# Patient Record
Sex: MALE | Race: WHITE | Employment: OTHER | URBAN - METROPOLITAN AREA
[De-identification: names, ages, dates, MRNs, and addresses within clinical notes are randomized per-mention and may not be internally consistent; named-entity substitution may affect disease eponyms.]

---

## 2017-03-01 DIAGNOSIS — I25.10 CORONARY ARTERY DISEASE INVOLVING NATIVE CORONARY ARTERY OF NATIVE HEART WITHOUT ANGINA PECTORIS: ICD-10-CM

## 2017-03-01 DIAGNOSIS — I10 ESSENTIAL HYPERTENSION: ICD-10-CM

## 2017-03-02 RX ORDER — PRASUGREL HCL 10 MG
TABLET ORAL
Qty: 30 TABLET | Refills: 0 | Status: SHIPPED | OUTPATIENT
Start: 2017-03-02 | End: 2017-03-08 | Stop reason: SDUPTHER

## 2017-03-02 RX ORDER — ASPIRIN 81 MG/1
TABLET ORAL
Qty: 30 TABLET | Refills: 0 | Status: SHIPPED | OUTPATIENT
Start: 2017-03-02 | End: 2017-03-08 | Stop reason: SDUPTHER

## 2017-03-02 RX ORDER — METOPROLOL SUCCINATE 25 MG/1
TABLET, EXTENDED RELEASE ORAL
Qty: 30 TABLET | Refills: 0 | Status: SHIPPED | OUTPATIENT
Start: 2017-03-02 | End: 2017-03-08 | Stop reason: SDUPTHER

## 2017-03-07 DIAGNOSIS — I10 ESSENTIAL HYPERTENSION: ICD-10-CM

## 2017-03-07 DIAGNOSIS — I25.10 CORONARY ARTERY DISEASE INVOLVING NATIVE CORONARY ARTERY OF NATIVE HEART WITHOUT ANGINA PECTORIS: ICD-10-CM

## 2017-03-07 RX ORDER — LISINOPRIL 2.5 MG/1
TABLET ORAL
Qty: 30 TABLET | Refills: 11 | OUTPATIENT
Start: 2017-03-07

## 2017-03-07 RX ORDER — PRASUGREL 10 MG/1
TABLET, FILM COATED ORAL
Qty: 30 TABLET | Refills: 0 | OUTPATIENT
Start: 2017-03-07

## 2017-03-07 RX ORDER — METOPROLOL SUCCINATE 25 MG/1
TABLET, EXTENDED RELEASE ORAL
Qty: 30 TABLET | Refills: 0 | OUTPATIENT
Start: 2017-03-07

## 2017-03-07 RX ORDER — ASPIRIN 81 MG/1
TABLET ORAL
Qty: 30 TABLET | Refills: 0 | OUTPATIENT
Start: 2017-03-07

## 2017-03-07 RX ORDER — ATORVASTATIN CALCIUM 80 MG/1
TABLET, FILM COATED ORAL
Qty: 30 TABLET | Refills: 11 | OUTPATIENT
Start: 2017-03-07

## 2017-03-08 ENCOUNTER — OFFICE VISIT (OUTPATIENT)
Dept: PRIMARY CARE CLINIC | Age: 67
End: 2017-03-08

## 2017-03-08 VITALS
RESPIRATION RATE: 16 BRPM | BODY MASS INDEX: 27.74 KG/M2 | WEIGHT: 183 LBS | OXYGEN SATURATION: 98 % | HEART RATE: 93 BPM | HEIGHT: 68 IN | SYSTOLIC BLOOD PRESSURE: 100 MMHG | DIASTOLIC BLOOD PRESSURE: 72 MMHG

## 2017-03-08 DIAGNOSIS — I10 ESSENTIAL HYPERTENSION: ICD-10-CM

## 2017-03-08 DIAGNOSIS — I25.10 CORONARY ARTERY DISEASE INVOLVING NATIVE CORONARY ARTERY OF NATIVE HEART WITHOUT ANGINA PECTORIS: ICD-10-CM

## 2017-03-08 DIAGNOSIS — Z23 NEED FOR INFLUENZA VACCINATION: Primary | ICD-10-CM

## 2017-03-08 PROCEDURE — 90658 IIV3 VACCINE SPLT 0.5 ML IM: CPT | Performed by: INTERNAL MEDICINE

## 2017-03-08 PROCEDURE — 99213 OFFICE O/P EST LOW 20 MIN: CPT | Performed by: INTERNAL MEDICINE

## 2017-03-08 PROCEDURE — 90471 IMMUNIZATION ADMIN: CPT | Performed by: INTERNAL MEDICINE

## 2017-03-08 RX ORDER — PRASUGREL 10 MG/1
10 TABLET, FILM COATED ORAL DAILY
Qty: 30 TABLET | Refills: 11 | Status: SHIPPED | OUTPATIENT
Start: 2017-03-08 | End: 2018-03-14 | Stop reason: SDUPTHER

## 2017-03-08 RX ORDER — ASPIRIN 81 MG/1
81 TABLET ORAL DAILY
Qty: 30 TABLET | Refills: 11 | Status: SHIPPED | OUTPATIENT
Start: 2017-03-08

## 2017-03-08 RX ORDER — ATORVASTATIN CALCIUM 80 MG/1
80 TABLET, FILM COATED ORAL NIGHTLY
Qty: 30 TABLET | Refills: 11 | Status: SHIPPED | OUTPATIENT
Start: 2017-03-08 | End: 2018-03-14 | Stop reason: SDUPTHER

## 2017-03-08 RX ORDER — METOPROLOL SUCCINATE 25 MG/1
25 TABLET, EXTENDED RELEASE ORAL DAILY
Qty: 30 TABLET | Refills: 11 | Status: SHIPPED | OUTPATIENT
Start: 2017-03-08 | End: 2018-03-13 | Stop reason: SDUPTHER

## 2017-03-08 RX ORDER — LISINOPRIL 2.5 MG/1
2.5 TABLET ORAL DAILY
Qty: 30 TABLET | Refills: 11 | Status: SHIPPED | OUTPATIENT
Start: 2017-03-08 | End: 2018-03-14 | Stop reason: SDUPTHER

## 2017-03-19 ASSESSMENT — ENCOUNTER SYMPTOMS
BLURRED VISION: 0
PHOTOPHOBIA: 0
SHORTNESS OF BREATH: 0
CHEST TIGHTNESS: 0
VOMITING: 0
TROUBLE SWALLOWING: 0
CHOKING: 0
NAUSEA: 0
VOICE CHANGE: 0

## 2017-07-31 RX ORDER — NITROGLYCERIN 0.4 MG/1
TABLET SUBLINGUAL
Qty: 25 TABLET | Refills: 2 | Status: SHIPPED | OUTPATIENT
Start: 2017-07-31 | End: 2018-03-14 | Stop reason: SDUPTHER

## 2018-03-02 DIAGNOSIS — I10 ESSENTIAL HYPERTENSION: ICD-10-CM

## 2018-03-02 RX ORDER — METOPROLOL SUCCINATE 25 MG/1
25 TABLET, EXTENDED RELEASE ORAL DAILY
Qty: 30 TABLET | Refills: 11 | OUTPATIENT
Start: 2018-03-02

## 2018-03-13 ENCOUNTER — OFFICE VISIT (OUTPATIENT)
Dept: PRIMARY CARE CLINIC | Age: 68
End: 2018-03-13
Payer: MEDICARE

## 2018-03-13 VITALS
HEIGHT: 68 IN | SYSTOLIC BLOOD PRESSURE: 118 MMHG | RESPIRATION RATE: 16 BRPM | BODY MASS INDEX: 28.05 KG/M2 | TEMPERATURE: 96.1 F | DIASTOLIC BLOOD PRESSURE: 80 MMHG | HEART RATE: 55 BPM | WEIGHT: 185.1 LBS | OXYGEN SATURATION: 98 %

## 2018-03-13 DIAGNOSIS — E78.00 PURE HYPERCHOLESTEROLEMIA: ICD-10-CM

## 2018-03-13 DIAGNOSIS — I25.10 CORONARY ARTERY DISEASE INVOLVING NATIVE CORONARY ARTERY OF NATIVE HEART WITHOUT ANGINA PECTORIS: ICD-10-CM

## 2018-03-13 DIAGNOSIS — E79.0 HYPERURICEMIA: ICD-10-CM

## 2018-03-13 DIAGNOSIS — Z00.00 PREVENTATIVE HEALTH CARE: ICD-10-CM

## 2018-03-13 DIAGNOSIS — Z11.59 ENCOUNTER FOR HEPATITIS C SCREENING TEST FOR LOW RISK PATIENT: ICD-10-CM

## 2018-03-13 DIAGNOSIS — Z12.5 PROSTATE CANCER SCREENING: ICD-10-CM

## 2018-03-13 DIAGNOSIS — Z23 NEED FOR VACCINATION WITH 13-POLYVALENT PNEUMOCOCCAL CONJUGATE VACCINE: ICD-10-CM

## 2018-03-13 DIAGNOSIS — I10 ESSENTIAL HYPERTENSION: Primary | ICD-10-CM

## 2018-03-13 PROCEDURE — 3017F COLORECTAL CA SCREEN DOC REV: CPT | Performed by: INTERNAL MEDICINE

## 2018-03-13 PROCEDURE — 1036F TOBACCO NON-USER: CPT | Performed by: INTERNAL MEDICINE

## 2018-03-13 PROCEDURE — 99214 OFFICE O/P EST MOD 30 MIN: CPT | Performed by: INTERNAL MEDICINE

## 2018-03-13 PROCEDURE — G8482 FLU IMMUNIZE ORDER/ADMIN: HCPCS | Performed by: INTERNAL MEDICINE

## 2018-03-13 PROCEDURE — 1123F ACP DISCUSS/DSCN MKR DOCD: CPT | Performed by: INTERNAL MEDICINE

## 2018-03-13 PROCEDURE — G8598 ASA/ANTIPLAT THER USED: HCPCS | Performed by: INTERNAL MEDICINE

## 2018-03-13 PROCEDURE — G8419 CALC BMI OUT NRM PARAM NOF/U: HCPCS | Performed by: INTERNAL MEDICINE

## 2018-03-13 PROCEDURE — 90670 PCV13 VACCINE IM: CPT | Performed by: INTERNAL MEDICINE

## 2018-03-13 PROCEDURE — 4040F PNEUMOC VAC/ADMIN/RCVD: CPT | Performed by: INTERNAL MEDICINE

## 2018-03-13 PROCEDURE — G0009 ADMIN PNEUMOCOCCAL VACCINE: HCPCS | Performed by: INTERNAL MEDICINE

## 2018-03-13 PROCEDURE — G8427 DOCREV CUR MEDS BY ELIG CLIN: HCPCS | Performed by: INTERNAL MEDICINE

## 2018-03-13 RX ORDER — METOPROLOL SUCCINATE 25 MG/1
25 TABLET, EXTENDED RELEASE ORAL DAILY
Qty: 90 TABLET | Refills: 3 | Status: SHIPPED | OUTPATIENT
Start: 2018-03-13 | End: 2018-03-13 | Stop reason: SDUPTHER

## 2018-03-13 RX ORDER — METOPROLOL SUCCINATE 25 MG/1
25 TABLET, EXTENDED RELEASE ORAL DAILY
Qty: 90 TABLET | Refills: 3 | Status: SHIPPED | OUTPATIENT
Start: 2018-03-13 | End: 2019-03-12 | Stop reason: SDUPTHER

## 2018-03-13 RX ORDER — INFLUENZA VACCINE, ADJUVANTED 15; 15; 15 UG/.5ML; UG/.5ML; UG/.5ML
INJECTION, SUSPENSION INTRAMUSCULAR
Refills: 0 | COMMUNITY
Start: 2018-01-29 | End: 2018-05-07

## 2018-03-13 RX ORDER — METOPROLOL SUCCINATE 25 MG/1
25 TABLET, EXTENDED RELEASE ORAL DAILY
Qty: 14 TABLET | Refills: 0 | Status: SHIPPED | OUTPATIENT
Start: 2018-03-13 | End: 2018-05-07

## 2018-03-13 RX ORDER — PRASUGREL 10 MG/1
10 TABLET, FILM COATED ORAL
COMMUNITY
End: 2019-03-12 | Stop reason: SDUPTHER

## 2018-03-13 RX ORDER — METOPROLOL SUCCINATE 25 MG/1
25 TABLET, EXTENDED RELEASE ORAL
COMMUNITY
Start: 2015-01-13 | End: 2018-05-07

## 2018-03-13 RX ORDER — ALBUTEROL SULFATE 90 UG/1
2 AEROSOL, METERED RESPIRATORY (INHALATION)
COMMUNITY
Start: 2015-01-13 | End: 2018-05-07

## 2018-03-13 ASSESSMENT — PATIENT HEALTH QUESTIONNAIRE - PHQ9
SUM OF ALL RESPONSES TO PHQ QUESTIONS 1-9: 0
1. LITTLE INTEREST OR PLEASURE IN DOING THINGS: 0
SUM OF ALL RESPONSES TO PHQ9 QUESTIONS 1 & 2: 0
2. FEELING DOWN, DEPRESSED OR HOPELESS: 0

## 2018-03-14 DIAGNOSIS — I10 ESSENTIAL HYPERTENSION: ICD-10-CM

## 2018-03-14 DIAGNOSIS — I25.10 CORONARY ARTERY DISEASE INVOLVING NATIVE CORONARY ARTERY OF NATIVE HEART WITHOUT ANGINA PECTORIS: ICD-10-CM

## 2018-03-14 RX ORDER — LISINOPRIL 2.5 MG/1
2.5 TABLET ORAL DAILY
Qty: 30 TABLET | Refills: 11 | Status: SHIPPED | OUTPATIENT
Start: 2018-03-14 | End: 2018-05-18 | Stop reason: SDUPTHER

## 2018-03-14 RX ORDER — ATORVASTATIN CALCIUM 80 MG/1
40 TABLET, FILM COATED ORAL NIGHTLY
Qty: 30 TABLET | Refills: 5 | Status: SHIPPED | OUTPATIENT
Start: 2018-03-14 | End: 2018-05-18 | Stop reason: SDUPTHER

## 2018-03-14 RX ORDER — NITROGLYCERIN 0.4 MG/1
TABLET SUBLINGUAL
Qty: 25 TABLET | Refills: 2 | Status: SHIPPED | OUTPATIENT
Start: 2018-03-14 | End: 2019-03-12 | Stop reason: SDUPTHER

## 2018-03-14 RX ORDER — PRASUGREL 10 MG/1
10 TABLET, FILM COATED ORAL DAILY
Qty: 30 TABLET | Refills: 11 | Status: SHIPPED | OUTPATIENT
Start: 2018-03-14 | End: 2018-05-07

## 2018-03-15 DIAGNOSIS — E78.00 PURE HYPERCHOLESTEROLEMIA: ICD-10-CM

## 2018-03-15 DIAGNOSIS — Z00.00 PREVENTATIVE HEALTH CARE: ICD-10-CM

## 2018-03-15 DIAGNOSIS — Z11.59 ENCOUNTER FOR HEPATITIS C SCREENING TEST FOR LOW RISK PATIENT: ICD-10-CM

## 2018-03-15 DIAGNOSIS — Z12.5 PROSTATE CANCER SCREENING: ICD-10-CM

## 2018-03-15 DIAGNOSIS — E79.0 HYPERURICEMIA: ICD-10-CM

## 2018-03-15 DIAGNOSIS — I25.10 CORONARY ARTERY DISEASE INVOLVING NATIVE CORONARY ARTERY OF NATIVE HEART WITHOUT ANGINA PECTORIS: ICD-10-CM

## 2018-03-15 DIAGNOSIS — I10 ESSENTIAL HYPERTENSION: ICD-10-CM

## 2018-03-15 LAB
ALBUMIN SERPL-MCNC: 4.4 G/DL (ref 3.9–4.9)
ALP BLD-CCNC: 96 U/L (ref 35–104)
ALT SERPL-CCNC: 20 U/L (ref 0–41)
ANION GAP SERPL CALCULATED.3IONS-SCNC: 14 MEQ/L (ref 7–13)
AST SERPL-CCNC: 20 U/L (ref 0–40)
BASOPHILS ABSOLUTE: 0 K/UL (ref 0–0.2)
BASOPHILS RELATIVE PERCENT: 0.6 %
BILIRUB SERPL-MCNC: 0.6 MG/DL (ref 0–1.2)
BUN BLDV-MCNC: 21 MG/DL (ref 8–23)
CALCIUM SERPL-MCNC: 9.2 MG/DL (ref 8.6–10.2)
CHLORIDE BLD-SCNC: 102 MEQ/L (ref 98–107)
CHOLESTEROL, TOTAL: 130 MG/DL (ref 0–199)
CO2: 26 MEQ/L (ref 22–29)
CREAT SERPL-MCNC: 0.72 MG/DL (ref 0.7–1.2)
EOSINOPHILS ABSOLUTE: 0.2 K/UL (ref 0–0.7)
EOSINOPHILS RELATIVE PERCENT: 4 %
GFR AFRICAN AMERICAN: >60
GFR NON-AFRICAN AMERICAN: >60
GLOBULIN: 2.2 G/DL (ref 2.3–3.5)
GLUCOSE BLD-MCNC: 107 MG/DL (ref 74–109)
HCT VFR BLD CALC: 44.7 % (ref 42–52)
HDLC SERPL-MCNC: 49 MG/DL (ref 40–59)
HEMOGLOBIN: 15.4 G/DL (ref 14–18)
HEPATITIS C ANTIBODY INTERPRETATION: NORMAL
LDL CHOLESTEROL CALCULATED: 66 MG/DL (ref 0–129)
LYMPHOCYTES ABSOLUTE: 1 K/UL (ref 1–4.8)
LYMPHOCYTES RELATIVE PERCENT: 17.5 %
MCH RBC QN AUTO: 33.2 PG (ref 27–31.3)
MCHC RBC AUTO-ENTMCNC: 34.4 % (ref 33–37)
MCV RBC AUTO: 96.3 FL (ref 80–100)
MONOCYTES ABSOLUTE: 0.4 K/UL (ref 0.2–0.8)
MONOCYTES RELATIVE PERCENT: 7.7 %
NEUTROPHILS ABSOLUTE: 4.1 K/UL (ref 1.4–6.5)
NEUTROPHILS RELATIVE PERCENT: 70.2 %
PDW BLD-RTO: 13.1 % (ref 11.5–14.5)
PLATELET # BLD: 180 K/UL (ref 130–400)
POTASSIUM SERPL-SCNC: 4.3 MEQ/L (ref 3.5–5.1)
PROSTATE SPECIFIC ANTIGEN: 5.41 NG/ML (ref 0–5.4)
RBC # BLD: 4.64 M/UL (ref 4.7–6.1)
SODIUM BLD-SCNC: 142 MEQ/L (ref 132–144)
TOTAL PROTEIN: 6.6 G/DL (ref 6.4–8.1)
TRIGL SERPL-MCNC: 73 MG/DL (ref 0–200)
URIC ACID, SERUM: 4.9 MG/DL (ref 3.4–7)
WBC # BLD: 5.9 K/UL (ref 4.8–10.8)

## 2018-03-18 ASSESSMENT — ENCOUNTER SYMPTOMS
VOMITING: 0
TROUBLE SWALLOWING: 0
CHOKING: 0
NAUSEA: 0
VOICE CHANGE: 0
SHORTNESS OF BREATH: 0
CHEST TIGHTNESS: 0
BLURRED VISION: 0
PHOTOPHOBIA: 0

## 2018-03-22 DIAGNOSIS — R97.20 ELEVATED PSA: Primary | ICD-10-CM

## 2018-03-22 RX ORDER — CIPROFLOXACIN 500 MG/1
500 TABLET, FILM COATED ORAL 2 TIMES DAILY
Qty: 42 TABLET | Refills: 0 | Status: SHIPPED | OUTPATIENT
Start: 2018-03-22 | End: 2018-05-07

## 2018-03-27 DIAGNOSIS — R97.20 ELEVATED PSA: Primary | ICD-10-CM

## 2018-03-28 ENCOUNTER — TELEPHONE (OUTPATIENT)
Dept: PRIMARY CARE CLINIC | Age: 68
End: 2018-03-28

## 2018-03-28 RX ORDER — SULFAMETHOXAZOLE AND TRIMETHOPRIM 800; 160 MG/1; MG/1
1 TABLET ORAL 2 TIMES DAILY
Qty: 28 TABLET | Refills: 0 | Status: SHIPPED | OUTPATIENT
Start: 2018-03-28 | End: 2018-04-11

## 2018-04-09 ENCOUNTER — TELEPHONE (OUTPATIENT)
Dept: PRIMARY CARE CLINIC | Age: 68
End: 2018-04-09

## 2018-04-12 ENCOUNTER — OFFICE VISIT (OUTPATIENT)
Dept: PRIMARY CARE CLINIC | Age: 68
End: 2018-04-12
Payer: MEDICARE

## 2018-04-12 VITALS
DIASTOLIC BLOOD PRESSURE: 70 MMHG | HEIGHT: 68 IN | BODY MASS INDEX: 27.16 KG/M2 | SYSTOLIC BLOOD PRESSURE: 110 MMHG | HEART RATE: 73 BPM | TEMPERATURE: 97.9 F | RESPIRATION RATE: 16 BRPM | OXYGEN SATURATION: 96 % | WEIGHT: 179.2 LBS

## 2018-04-12 DIAGNOSIS — R97.20 ELEVATED PSA: Primary | ICD-10-CM

## 2018-04-12 DIAGNOSIS — R97.20 ELEVATED PSA: ICD-10-CM

## 2018-04-12 PROCEDURE — 3017F COLORECTAL CA SCREEN DOC REV: CPT | Performed by: INTERNAL MEDICINE

## 2018-04-12 PROCEDURE — 99212 OFFICE O/P EST SF 10 MIN: CPT | Performed by: INTERNAL MEDICINE

## 2018-04-12 PROCEDURE — 1123F ACP DISCUSS/DSCN MKR DOCD: CPT | Performed by: INTERNAL MEDICINE

## 2018-04-12 PROCEDURE — G8419 CALC BMI OUT NRM PARAM NOF/U: HCPCS | Performed by: INTERNAL MEDICINE

## 2018-04-12 PROCEDURE — G8427 DOCREV CUR MEDS BY ELIG CLIN: HCPCS | Performed by: INTERNAL MEDICINE

## 2018-04-12 PROCEDURE — 1036F TOBACCO NON-USER: CPT | Performed by: INTERNAL MEDICINE

## 2018-04-12 PROCEDURE — 4040F PNEUMOC VAC/ADMIN/RCVD: CPT | Performed by: INTERNAL MEDICINE

## 2018-04-12 PROCEDURE — G8598 ASA/ANTIPLAT THER USED: HCPCS | Performed by: INTERNAL MEDICINE

## 2018-04-16 LAB
PROSTATE SPECIFIC ANTIGEN FREE: 0.6 UG/L
PROSTATE SPECIFIC ANTIGEN PERCENT FREE: 12.2 %
PROSTATE SPECIFIC ANTIGEN: 4.9 UG/L (ref 0–4)

## 2018-04-16 ASSESSMENT — ENCOUNTER SYMPTOMS
VOMITING: 0
PHOTOPHOBIA: 0
NAUSEA: 0
SHORTNESS OF BREATH: 0
TROUBLE SWALLOWING: 0
VOICE CHANGE: 0
CHOKING: 0

## 2018-05-07 ENCOUNTER — OFFICE VISIT (OUTPATIENT)
Dept: UROLOGY | Age: 68
End: 2018-05-07
Payer: MEDICARE

## 2018-05-07 VITALS
SYSTOLIC BLOOD PRESSURE: 102 MMHG | BODY MASS INDEX: 26.52 KG/M2 | HEART RATE: 65 BPM | WEIGHT: 175 LBS | HEIGHT: 68 IN | DIASTOLIC BLOOD PRESSURE: 64 MMHG

## 2018-05-07 DIAGNOSIS — R33.9 URINARY RETENTION: ICD-10-CM

## 2018-05-07 DIAGNOSIS — R97.20 ELEVATED PSA: Primary | ICD-10-CM

## 2018-05-07 LAB
BILIRUBIN, POC: NORMAL
BLOOD URINE, POC: NORMAL
CLARITY, POC: CLEAR
COLOR, POC: YELLOW
GLUCOSE URINE, POC: NORMAL
KETONES, POC: NORMAL
LEUKOCYTE EST, POC: NORMAL
NITRITE, POC: NORMAL
PH, POC: 5
POST VOID RESIDUAL (PVR): 82 ML
PROTEIN, POC: NORMAL
SPECIFIC GRAVITY, POC: 1.02
UROBILINOGEN, POC: 0.2

## 2018-05-07 PROCEDURE — 81003 URINALYSIS AUTO W/O SCOPE: CPT | Performed by: UROLOGY

## 2018-05-07 PROCEDURE — G8598 ASA/ANTIPLAT THER USED: HCPCS | Performed by: UROLOGY

## 2018-05-07 PROCEDURE — 1123F ACP DISCUSS/DSCN MKR DOCD: CPT | Performed by: UROLOGY

## 2018-05-07 PROCEDURE — 3017F COLORECTAL CA SCREEN DOC REV: CPT | Performed by: UROLOGY

## 2018-05-07 PROCEDURE — 51798 US URINE CAPACITY MEASURE: CPT | Performed by: UROLOGY

## 2018-05-07 PROCEDURE — G8419 CALC BMI OUT NRM PARAM NOF/U: HCPCS | Performed by: UROLOGY

## 2018-05-07 PROCEDURE — 1036F TOBACCO NON-USER: CPT | Performed by: UROLOGY

## 2018-05-07 PROCEDURE — 4040F PNEUMOC VAC/ADMIN/RCVD: CPT | Performed by: UROLOGY

## 2018-05-07 PROCEDURE — G8427 DOCREV CUR MEDS BY ELIG CLIN: HCPCS | Performed by: UROLOGY

## 2018-05-07 PROCEDURE — 99203 OFFICE O/P NEW LOW 30 MIN: CPT | Performed by: UROLOGY

## 2018-05-18 DIAGNOSIS — I10 ESSENTIAL HYPERTENSION: ICD-10-CM

## 2018-05-18 DIAGNOSIS — I25.10 CORONARY ARTERY DISEASE INVOLVING NATIVE CORONARY ARTERY OF NATIVE HEART WITHOUT ANGINA PECTORIS: ICD-10-CM

## 2018-05-19 RX ORDER — LISINOPRIL 2.5 MG/1
2.5 TABLET ORAL DAILY
Qty: 90 TABLET | Refills: 1 | Status: SHIPPED | OUTPATIENT
Start: 2018-05-19 | End: 2018-10-04 | Stop reason: SDUPTHER

## 2018-05-19 RX ORDER — ATORVASTATIN CALCIUM 40 MG/1
40 TABLET, FILM COATED ORAL NIGHTLY
Qty: 90 TABLET | Refills: 1 | Status: SHIPPED | OUTPATIENT
Start: 2018-05-19 | End: 2018-10-04 | Stop reason: SDUPTHER

## 2018-05-21 ENCOUNTER — HOSPITAL ENCOUNTER (OUTPATIENT)
Dept: PREADMISSION TESTING | Age: 68
Discharge: HOME | End: 2018-05-25
Payer: MEDICARE

## 2018-05-21 VITALS
SYSTOLIC BLOOD PRESSURE: 111 MMHG | HEART RATE: 63 BPM | BODY MASS INDEX: 27.49 KG/M2 | OXYGEN SATURATION: 97 % | HEIGHT: 68 IN | RESPIRATION RATE: 16 BRPM | TEMPERATURE: 97.6 F | WEIGHT: 181.4 LBS | DIASTOLIC BLOOD PRESSURE: 69 MMHG

## 2018-05-21 DIAGNOSIS — R97.20 ELEVATED PSA: ICD-10-CM

## 2018-05-21 LAB
ANION GAP SERPL CALCULATED.3IONS-SCNC: 13 MEQ/L (ref 7–13)
BUN BLDV-MCNC: 15 MG/DL (ref 8–23)
CALCIUM SERPL-MCNC: 9.4 MG/DL (ref 8.6–10.2)
CHLORIDE BLD-SCNC: 105 MEQ/L (ref 98–107)
CO2: 24 MEQ/L (ref 22–29)
CREAT SERPL-MCNC: 0.81 MG/DL (ref 0.7–1.2)
EKG ATRIAL RATE: 58 BPM
EKG P AXIS: 53 DEGREES
EKG P-R INTERVAL: 194 MS
EKG Q-T INTERVAL: 402 MS
EKG QRS DURATION: 80 MS
EKG QTC CALCULATION (BAZETT): 394 MS
EKG R AXIS: -19 DEGREES
EKG T AXIS: 49 DEGREES
EKG VENTRICULAR RATE: 58 BPM
GFR AFRICAN AMERICAN: >60
GFR NON-AFRICAN AMERICAN: >60
GLUCOSE BLD-MCNC: 96 MG/DL (ref 74–109)
HCT VFR BLD CALC: 44.9 % (ref 42–52)
HEMOGLOBIN: 15.5 G/DL (ref 14–18)
MCH RBC QN AUTO: 32.7 PG (ref 27–31.3)
MCHC RBC AUTO-ENTMCNC: 34.6 % (ref 33–37)
MCV RBC AUTO: 94.5 FL (ref 80–100)
PDW BLD-RTO: 13 % (ref 11.5–14.5)
PLATELET # BLD: 182 K/UL (ref 130–400)
POTASSIUM SERPL-SCNC: 4.6 MEQ/L (ref 3.5–5.1)
RBC # BLD: 4.75 M/UL (ref 4.7–6.1)
SODIUM BLD-SCNC: 142 MEQ/L (ref 132–144)
WBC # BLD: 6.1 K/UL (ref 4.8–10.8)

## 2018-05-21 PROCEDURE — 93005 ELECTROCARDIOGRAM TRACING: CPT

## 2018-05-21 PROCEDURE — 85027 COMPLETE CBC AUTOMATED: CPT

## 2018-05-21 PROCEDURE — 80048 BASIC METABOLIC PNL TOTAL CA: CPT

## 2018-05-21 RX ORDER — SODIUM PHOSPHATE,MONO-DIBASIC 19G-7G/118
ENEMA (ML) RECTAL DAILY
COMMUNITY

## 2018-05-21 RX ORDER — LIDOCAINE HYDROCHLORIDE 10 MG/ML
1 INJECTION, SOLUTION EPIDURAL; INFILTRATION; INTRACAUDAL; PERINEURAL
Status: CANCELLED | OUTPATIENT
Start: 2018-05-21 | End: 2018-05-21

## 2018-05-21 RX ORDER — SODIUM CHLORIDE 0.9 % (FLUSH) 0.9 %
10 SYRINGE (ML) INJECTION EVERY 12 HOURS SCHEDULED
Status: CANCELLED | OUTPATIENT
Start: 2018-05-21

## 2018-05-21 RX ORDER — SODIUM CHLORIDE, SODIUM LACTATE, POTASSIUM CHLORIDE, CALCIUM CHLORIDE 600; 310; 30; 20 MG/100ML; MG/100ML; MG/100ML; MG/100ML
INJECTION, SOLUTION INTRAVENOUS CONTINUOUS
Status: CANCELLED | OUTPATIENT
Start: 2018-05-21

## 2018-05-21 RX ORDER — SODIUM CHLORIDE 0.9 % (FLUSH) 0.9 %
10 SYRINGE (ML) INJECTION PRN
Status: CANCELLED | OUTPATIENT
Start: 2018-05-21

## 2018-05-22 ENCOUNTER — ANESTHESIA EVENT (OUTPATIENT)
Dept: OPERATING ROOM | Age: 68
End: 2018-05-22
Payer: MEDICARE

## 2018-05-22 PROCEDURE — 93010 ELECTROCARDIOGRAM REPORT: CPT | Performed by: INTERNAL MEDICINE

## 2018-05-23 ENCOUNTER — HOSPITAL ENCOUNTER (OUTPATIENT)
Dept: ULTRASOUND IMAGING | Age: 68
Discharge: HOME | End: 2018-05-25
Payer: MEDICARE

## 2018-05-23 ENCOUNTER — HOSPITAL ENCOUNTER (OUTPATIENT)
Age: 68
Setting detail: OUTPATIENT SURGERY
Discharge: HOME | End: 2018-05-23
Attending: UROLOGY | Admitting: UROLOGY
Payer: MEDICARE

## 2018-05-23 ENCOUNTER — ANESTHESIA (OUTPATIENT)
Dept: OPERATING ROOM | Age: 68
End: 2018-05-23
Payer: MEDICARE

## 2018-05-23 VITALS
HEART RATE: 66 BPM | BODY MASS INDEX: 27.43 KG/M2 | HEIGHT: 68 IN | RESPIRATION RATE: 16 BRPM | TEMPERATURE: 98.1 F | WEIGHT: 181 LBS | DIASTOLIC BLOOD PRESSURE: 83 MMHG | OXYGEN SATURATION: 100 % | SYSTOLIC BLOOD PRESSURE: 124 MMHG

## 2018-05-23 VITALS — SYSTOLIC BLOOD PRESSURE: 127 MMHG | OXYGEN SATURATION: 90 % | DIASTOLIC BLOOD PRESSURE: 79 MMHG

## 2018-05-23 PROCEDURE — 6360000002 HC RX W HCPCS: Performed by: NURSE PRACTITIONER

## 2018-05-23 PROCEDURE — 7100000011 HC PHASE II RECOVERY - ADDTL 15 MIN: Performed by: UROLOGY

## 2018-05-23 PROCEDURE — 2500000003 HC RX 250 WO HCPCS: Performed by: NURSE ANESTHETIST, CERTIFIED REGISTERED

## 2018-05-23 PROCEDURE — 3600000002 HC SURGERY LEVEL 2 BASE: Performed by: UROLOGY

## 2018-05-23 PROCEDURE — 76872 US TRANSRECTAL: CPT | Performed by: UROLOGY

## 2018-05-23 PROCEDURE — 55700 PR BIOPSY OF PROSTATE,NEEDLE/PUNCH: CPT | Performed by: UROLOGY

## 2018-05-23 PROCEDURE — 3600000012 HC SURGERY LEVEL 2 ADDTL 15MIN: Performed by: UROLOGY

## 2018-05-23 PROCEDURE — 3700000001 HC ADD 15 MINUTES (ANESTHESIA): Performed by: UROLOGY

## 2018-05-23 PROCEDURE — 2580000003 HC RX 258

## 2018-05-23 PROCEDURE — 7100000010 HC PHASE II RECOVERY - FIRST 15 MIN: Performed by: UROLOGY

## 2018-05-23 PROCEDURE — 7100000001 HC PACU RECOVERY - ADDTL 15 MIN: Performed by: UROLOGY

## 2018-05-23 PROCEDURE — 2580000003 HC RX 258: Performed by: NURSE PRACTITIONER

## 2018-05-23 PROCEDURE — 3700000000 HC ANESTHESIA ATTENDED CARE: Performed by: UROLOGY

## 2018-05-23 PROCEDURE — 99999 PR OFFICE/OUTPT VISIT,PROCEDURE ONLY: CPT | Performed by: UROLOGY

## 2018-05-23 PROCEDURE — 7100000000 HC PACU RECOVERY - FIRST 15 MIN: Performed by: UROLOGY

## 2018-05-23 PROCEDURE — 76942 ECHO GUIDE FOR BIOPSY: CPT

## 2018-05-23 PROCEDURE — 6360000002 HC RX W HCPCS: Performed by: NURSE ANESTHETIST, CERTIFIED REGISTERED

## 2018-05-23 RX ORDER — LIDOCAINE HYDROCHLORIDE 10 MG/ML
1 INJECTION, SOLUTION EPIDURAL; INFILTRATION; INTRACAUDAL; PERINEURAL
Status: DISCONTINUED | OUTPATIENT
Start: 2018-05-23 | End: 2018-05-23 | Stop reason: HOSPADM

## 2018-05-23 RX ORDER — SODIUM CHLORIDE 0.9 % (FLUSH) 0.9 %
10 SYRINGE (ML) INJECTION PRN
Status: DISCONTINUED | OUTPATIENT
Start: 2018-05-23 | End: 2018-05-23 | Stop reason: HOSPADM

## 2018-05-23 RX ORDER — ONDANSETRON 2 MG/ML
4 INJECTION INTRAMUSCULAR; INTRAVENOUS
Status: DISCONTINUED | OUTPATIENT
Start: 2018-05-23 | End: 2018-05-23 | Stop reason: HOSPADM

## 2018-05-23 RX ORDER — SODIUM CHLORIDE 0.9 % (FLUSH) 0.9 %
10 SYRINGE (ML) INJECTION EVERY 12 HOURS SCHEDULED
Status: DISCONTINUED | OUTPATIENT
Start: 2018-05-23 | End: 2018-05-23 | Stop reason: HOSPADM

## 2018-05-23 RX ORDER — LIDOCAINE HYDROCHLORIDE 20 MG/ML
INJECTION, SOLUTION INFILTRATION; PERINEURAL PRN
Status: DISCONTINUED | OUTPATIENT
Start: 2018-05-23 | End: 2018-05-23 | Stop reason: SDUPTHER

## 2018-05-23 RX ORDER — HYDROCODONE BITARTRATE AND ACETAMINOPHEN 5; 325 MG/1; MG/1
1 TABLET ORAL PRN
Status: DISCONTINUED | OUTPATIENT
Start: 2018-05-23 | End: 2018-05-23 | Stop reason: HOSPADM

## 2018-05-23 RX ORDER — METOCLOPRAMIDE HYDROCHLORIDE 5 MG/ML
10 INJECTION INTRAMUSCULAR; INTRAVENOUS
Status: DISCONTINUED | OUTPATIENT
Start: 2018-05-23 | End: 2018-05-23 | Stop reason: HOSPADM

## 2018-05-23 RX ORDER — PROPOFOL 10 MG/ML
INJECTION, EMULSION INTRAVENOUS PRN
Status: DISCONTINUED | OUTPATIENT
Start: 2018-05-23 | End: 2018-05-23 | Stop reason: SDUPTHER

## 2018-05-23 RX ORDER — SODIUM CHLORIDE, SODIUM LACTATE, POTASSIUM CHLORIDE, CALCIUM CHLORIDE 600; 310; 30; 20 MG/100ML; MG/100ML; MG/100ML; MG/100ML
INJECTION, SOLUTION INTRAVENOUS CONTINUOUS
Status: DISCONTINUED | OUTPATIENT
Start: 2018-05-23 | End: 2018-05-23 | Stop reason: HOSPADM

## 2018-05-23 RX ORDER — MEPERIDINE HYDROCHLORIDE 25 MG/ML
12.5 INJECTION INTRAMUSCULAR; INTRAVENOUS; SUBCUTANEOUS EVERY 5 MIN PRN
Status: DISCONTINUED | OUTPATIENT
Start: 2018-05-23 | End: 2018-05-23 | Stop reason: HOSPADM

## 2018-05-23 RX ORDER — DIPHENHYDRAMINE HYDROCHLORIDE 50 MG/ML
12.5 INJECTION INTRAMUSCULAR; INTRAVENOUS
Status: DISCONTINUED | OUTPATIENT
Start: 2018-05-23 | End: 2018-05-23 | Stop reason: HOSPADM

## 2018-05-23 RX ORDER — HYDROCODONE BITARTRATE AND ACETAMINOPHEN 5; 325 MG/1; MG/1
2 TABLET ORAL PRN
Status: DISCONTINUED | OUTPATIENT
Start: 2018-05-23 | End: 2018-05-23 | Stop reason: HOSPADM

## 2018-05-23 RX ORDER — FENTANYL CITRATE 50 UG/ML
50 INJECTION, SOLUTION INTRAMUSCULAR; INTRAVENOUS EVERY 10 MIN PRN
Status: DISCONTINUED | OUTPATIENT
Start: 2018-05-23 | End: 2018-05-23 | Stop reason: HOSPADM

## 2018-05-23 RX ORDER — SODIUM CHLORIDE, SODIUM LACTATE, POTASSIUM CHLORIDE, CALCIUM CHLORIDE 600; 310; 30; 20 MG/100ML; MG/100ML; MG/100ML; MG/100ML
INJECTION, SOLUTION INTRAVENOUS
Status: COMPLETED
Start: 2018-05-23 | End: 2018-05-23

## 2018-05-23 RX ADMIN — PROPOFOL 50 MG: 10 INJECTION, EMULSION INTRAVENOUS at 09:01

## 2018-05-23 RX ADMIN — LIDOCAINE HYDROCHLORIDE 20 MG: 20 INJECTION, SOLUTION INFILTRATION; PERINEURAL at 09:01

## 2018-05-23 RX ADMIN — PROPOFOL 40 MG: 10 INJECTION, EMULSION INTRAVENOUS at 09:03

## 2018-05-23 RX ADMIN — PROPOFOL 40 MG: 10 INJECTION, EMULSION INTRAVENOUS at 09:05

## 2018-05-23 RX ADMIN — PROPOFOL 40 MG: 10 INJECTION, EMULSION INTRAVENOUS at 09:00

## 2018-05-23 RX ADMIN — SODIUM CHLORIDE, SODIUM LACTATE, POTASSIUM CHLORIDE, CALCIUM CHLORIDE: 600; 310; 30; 20 INJECTION, SOLUTION INTRAVENOUS at 07:56

## 2018-05-23 RX ADMIN — PROPOFOL 40 MG: 10 INJECTION, EMULSION INTRAVENOUS at 09:07

## 2018-05-23 RX ADMIN — SODIUM CHLORIDE, POTASSIUM CHLORIDE, SODIUM LACTATE AND CALCIUM CHLORIDE: 600; 310; 30; 20 INJECTION, SOLUTION INTRAVENOUS at 07:56

## 2018-05-23 RX ADMIN — GENTAMICIN SULFATE 200 MG: 40 INJECTION, SOLUTION INTRAMUSCULAR; INTRAVENOUS at 09:01

## 2018-05-23 ASSESSMENT — PULMONARY FUNCTION TESTS
PIF_VALUE: 1
PIF_VALUE: 0
PIF_VALUE: 1
PIF_VALUE: 1
PIF_VALUE: 0
PIF_VALUE: 0
PIF_VALUE: 1
PIF_VALUE: 0
PIF_VALUE: 1
PIF_VALUE: 0

## 2018-05-23 ASSESSMENT — PAIN - FUNCTIONAL ASSESSMENT: PAIN_FUNCTIONAL_ASSESSMENT: 0-10

## 2018-05-23 ASSESSMENT — PAIN SCALES - GENERAL
PAINLEVEL_OUTOF10: 0

## 2018-05-31 ENCOUNTER — OFFICE VISIT (OUTPATIENT)
Dept: UROLOGY | Age: 68
End: 2018-05-31
Payer: MEDICARE

## 2018-05-31 VITALS
BODY MASS INDEX: 26.52 KG/M2 | HEART RATE: 68 BPM | DIASTOLIC BLOOD PRESSURE: 70 MMHG | WEIGHT: 175 LBS | HEIGHT: 68 IN | SYSTOLIC BLOOD PRESSURE: 110 MMHG

## 2018-05-31 DIAGNOSIS — C61 PROSTATE CANCER (HCC): Primary | ICD-10-CM

## 2018-05-31 PROCEDURE — G8419 CALC BMI OUT NRM PARAM NOF/U: HCPCS | Performed by: UROLOGY

## 2018-05-31 PROCEDURE — 99214 OFFICE O/P EST MOD 30 MIN: CPT | Performed by: UROLOGY

## 2018-05-31 PROCEDURE — 1123F ACP DISCUSS/DSCN MKR DOCD: CPT | Performed by: UROLOGY

## 2018-05-31 PROCEDURE — 3017F COLORECTAL CA SCREEN DOC REV: CPT | Performed by: UROLOGY

## 2018-05-31 PROCEDURE — 1036F TOBACCO NON-USER: CPT | Performed by: UROLOGY

## 2018-05-31 PROCEDURE — 4040F PNEUMOC VAC/ADMIN/RCVD: CPT | Performed by: UROLOGY

## 2018-05-31 PROCEDURE — G8427 DOCREV CUR MEDS BY ELIG CLIN: HCPCS | Performed by: UROLOGY

## 2018-05-31 PROCEDURE — G8598 ASA/ANTIPLAT THER USED: HCPCS | Performed by: UROLOGY

## 2018-10-04 DIAGNOSIS — I25.10 CORONARY ARTERY DISEASE INVOLVING NATIVE CORONARY ARTERY OF NATIVE HEART WITHOUT ANGINA PECTORIS: ICD-10-CM

## 2018-10-04 DIAGNOSIS — I10 ESSENTIAL HYPERTENSION: ICD-10-CM

## 2018-10-04 RX ORDER — ATORVASTATIN CALCIUM 40 MG/1
40 TABLET, FILM COATED ORAL NIGHTLY
Qty: 90 TABLET | Refills: 1 | Status: SHIPPED | OUTPATIENT
Start: 2018-10-04 | End: 2019-03-12 | Stop reason: SDUPTHER

## 2018-10-04 RX ORDER — LISINOPRIL 2.5 MG/1
2.5 TABLET ORAL DAILY
Qty: 90 TABLET | Refills: 1 | Status: SHIPPED | OUTPATIENT
Start: 2018-10-04 | End: 2019-03-12

## 2018-11-13 DIAGNOSIS — C61 PROSTATE CANCER (HCC): ICD-10-CM

## 2018-11-13 LAB — PROSTATE SPECIFIC ANTIGEN: 5.17 NG/ML (ref 0–5.4)

## 2018-11-30 ENCOUNTER — OFFICE VISIT (OUTPATIENT)
Dept: UROLOGY | Age: 68
End: 2018-11-30
Payer: MEDICARE

## 2018-11-30 VITALS
HEART RATE: 67 BPM | WEIGHT: 175 LBS | BODY MASS INDEX: 26.52 KG/M2 | SYSTOLIC BLOOD PRESSURE: 112 MMHG | DIASTOLIC BLOOD PRESSURE: 80 MMHG | HEIGHT: 68 IN

## 2018-11-30 DIAGNOSIS — C61 PROSTATE CANCER (HCC): Primary | ICD-10-CM

## 2018-11-30 PROCEDURE — G8427 DOCREV CUR MEDS BY ELIG CLIN: HCPCS | Performed by: UROLOGY

## 2018-11-30 PROCEDURE — 99213 OFFICE O/P EST LOW 20 MIN: CPT | Performed by: UROLOGY

## 2018-11-30 PROCEDURE — G8484 FLU IMMUNIZE NO ADMIN: HCPCS | Performed by: UROLOGY

## 2018-11-30 PROCEDURE — 1123F ACP DISCUSS/DSCN MKR DOCD: CPT | Performed by: UROLOGY

## 2018-11-30 PROCEDURE — 3017F COLORECTAL CA SCREEN DOC REV: CPT | Performed by: UROLOGY

## 2018-11-30 PROCEDURE — 4040F PNEUMOC VAC/ADMIN/RCVD: CPT | Performed by: UROLOGY

## 2018-11-30 PROCEDURE — 1101F PT FALLS ASSESS-DOCD LE1/YR: CPT | Performed by: UROLOGY

## 2018-11-30 PROCEDURE — G8419 CALC BMI OUT NRM PARAM NOF/U: HCPCS | Performed by: UROLOGY

## 2018-11-30 PROCEDURE — 1036F TOBACCO NON-USER: CPT | Performed by: UROLOGY

## 2018-11-30 PROCEDURE — G8598 ASA/ANTIPLAT THER USED: HCPCS | Performed by: UROLOGY

## 2018-11-30 NOTE — PROGRESS NOTES
[facility] EVALUATION NOTE                                                 H&P                                                                                                                                                 Reason for Visit  Prostate cancer watchful waiting    History of Present Illness  72-year-old male who underwent prostate biopsies at a PSA of 4.9, patient had low volume adenocarcinoma the prostate Ayo score 3+3 with no evidence of perineural invasion  After considering all treatment options patient has opted for surveillance, as watchful waiting  Most recent PSA shown a drop      Urologic Review of Systems/Symptoms  Denies hematuria  Denies dysuria  Denies incontinence  Denies flank pain  Other Urologic: Denies obstructive voiding symptoms nocturia of 0 times per night    Review of Systems  Head and neck: No issues/reviewed  Cardiac: No recent issues/reviewed  Pulmonary: No issues/reviewed  Gastrointestinal: No issues/reviewed  Neurologic: No recent issues/reviewed  Extremities: No issues/reviewed  Lymphatics: No lymphadenopathy no change  Genitourinary: See above  Skin: No issues/reviewed  Hospitalization: No recent hospitalization  No changes in medication  All 14 categories of Review of Systems otherwise reviewed no other findings reported.     Past Medical History:   Diagnosis Date    CAD (coronary artery disease)     1 stent 9-22-14    Colon polyps 2016    need repeat in 3 years, Dr Best Ya Diverticular disease 2016    Heart attack ([facility] [address] 09/22/2014    Hyperlipidemia     meds since MI 2014    Hypertension     meds since MI 2014    Smoker     2014 / quit smoking after MI     Past Surgical History:   Procedure Laterality Date    COLONOSCOPY  05/13/16    Baruch Lesch MD    RI SONO GUIDE NEEDLE BIOPSY N/A 5/23/2018    TRUS BX performed by Christoph Dos Santos MD at [place] [address] Marital status: Single     Spouse name: N/A    Number of

## 2019-01-14 DIAGNOSIS — I10 ESSENTIAL HYPERTENSION: ICD-10-CM

## 2019-01-14 RX ORDER — METOPROLOL SUCCINATE 25 MG/1
25 TABLET, EXTENDED RELEASE ORAL DAILY
Qty: 90 TABLET | OUTPATIENT
Start: 2019-01-14

## 2019-03-12 ENCOUNTER — OFFICE VISIT (OUTPATIENT)
Dept: PRIMARY CARE CLINIC | Age: 69
End: 2019-03-12
Payer: MEDICARE

## 2019-03-12 VITALS
DIASTOLIC BLOOD PRESSURE: 63 MMHG | TEMPERATURE: 97.6 F | SYSTOLIC BLOOD PRESSURE: 116 MMHG | HEART RATE: 56 BPM | OXYGEN SATURATION: 98 % | WEIGHT: 182 LBS | HEIGHT: 68 IN | RESPIRATION RATE: 14 BRPM | BODY MASS INDEX: 27.58 KG/M2

## 2019-03-12 DIAGNOSIS — R73.9 HYPERGLYCEMIA: ICD-10-CM

## 2019-03-12 DIAGNOSIS — C61 PROSTATE CANCER (HCC): ICD-10-CM

## 2019-03-12 DIAGNOSIS — I25.10 CORONARY ARTERY DISEASE INVOLVING NATIVE CORONARY ARTERY OF NATIVE HEART WITHOUT ANGINA PECTORIS: Primary | ICD-10-CM

## 2019-03-12 DIAGNOSIS — E78.00 PURE HYPERCHOLESTEROLEMIA: ICD-10-CM

## 2019-03-12 DIAGNOSIS — I25.10 CORONARY ARTERY DISEASE INVOLVING NATIVE CORONARY ARTERY OF NATIVE HEART WITHOUT ANGINA PECTORIS: ICD-10-CM

## 2019-03-12 LAB
ALBUMIN SERPL-MCNC: 4.5 G/DL (ref 3.5–4.6)
ALP BLD-CCNC: 85 U/L (ref 35–104)
ALT SERPL-CCNC: 18 U/L (ref 0–41)
ANION GAP SERPL CALCULATED.3IONS-SCNC: 15 MEQ/L (ref 9–15)
AST SERPL-CCNC: 21 U/L (ref 0–40)
BASOPHILS ABSOLUTE: 0 K/UL (ref 0–0.2)
BASOPHILS RELATIVE PERCENT: 0.6 %
BILIRUB SERPL-MCNC: 0.6 MG/DL (ref 0.2–0.7)
BUN BLDV-MCNC: 22 MG/DL (ref 8–23)
CALCIUM SERPL-MCNC: 9.1 MG/DL (ref 8.5–9.9)
CHLORIDE BLD-SCNC: 106 MEQ/L (ref 95–107)
CHOLESTEROL, TOTAL: 124 MG/DL (ref 0–199)
CO2: 23 MEQ/L (ref 20–31)
CREAT SERPL-MCNC: 0.81 MG/DL (ref 0.7–1.2)
EOSINOPHILS ABSOLUTE: 0.1 K/UL (ref 0–0.7)
EOSINOPHILS RELATIVE PERCENT: 2.3 %
GFR AFRICAN AMERICAN: >60
GFR NON-AFRICAN AMERICAN: >60
GLOBULIN: 2.7 G/DL (ref 2.3–3.5)
GLUCOSE BLD-MCNC: 77 MG/DL (ref 70–99)
HBA1C MFR BLD: 5.6 % (ref 4.8–5.9)
HCT VFR BLD CALC: 47 % (ref 42–52)
HDLC SERPL-MCNC: 48 MG/DL (ref 40–59)
HEMOGLOBIN: 15.7 G/DL (ref 14–18)
LDL CHOLESTEROL CALCULATED: 67 MG/DL (ref 0–129)
LYMPHOCYTES ABSOLUTE: 1.5 K/UL (ref 1–4.8)
LYMPHOCYTES RELATIVE PERCENT: 24.3 %
MCH RBC QN AUTO: 32.3 PG (ref 27–31.3)
MCHC RBC AUTO-ENTMCNC: 33.3 % (ref 33–37)
MCV RBC AUTO: 96.9 FL (ref 80–100)
MONOCYTES ABSOLUTE: 0.4 K/UL (ref 0.2–0.8)
MONOCYTES RELATIVE PERCENT: 6.3 %
NEUTROPHILS ABSOLUTE: 4.2 K/UL (ref 1.4–6.5)
NEUTROPHILS RELATIVE PERCENT: 66.5 %
PDW BLD-RTO: 13.4 % (ref 11.5–14.5)
PLATELET # BLD: 194 K/UL (ref 130–400)
POTASSIUM SERPL-SCNC: 4 MEQ/L (ref 3.4–4.9)
PROSTATE SPECIFIC ANTIGEN: 6.15 NG/ML (ref 0–5.4)
RBC # BLD: 4.85 M/UL (ref 4.7–6.1)
SODIUM BLD-SCNC: 144 MEQ/L (ref 135–144)
TOTAL PROTEIN: 7.2 G/DL (ref 6.3–8)
TRIGL SERPL-MCNC: 45 MG/DL (ref 0–150)
WBC # BLD: 6.3 K/UL (ref 4.8–10.8)

## 2019-03-12 PROCEDURE — 4040F PNEUMOC VAC/ADMIN/RCVD: CPT | Performed by: INTERNAL MEDICINE

## 2019-03-12 PROCEDURE — G8419 CALC BMI OUT NRM PARAM NOF/U: HCPCS | Performed by: INTERNAL MEDICINE

## 2019-03-12 PROCEDURE — G8427 DOCREV CUR MEDS BY ELIG CLIN: HCPCS | Performed by: INTERNAL MEDICINE

## 2019-03-12 PROCEDURE — 1036F TOBACCO NON-USER: CPT | Performed by: INTERNAL MEDICINE

## 2019-03-12 PROCEDURE — G8599 NO ASA/ANTIPLAT THER USE RNG: HCPCS | Performed by: INTERNAL MEDICINE

## 2019-03-12 PROCEDURE — 1101F PT FALLS ASSESS-DOCD LE1/YR: CPT | Performed by: INTERNAL MEDICINE

## 2019-03-12 PROCEDURE — G8484 FLU IMMUNIZE NO ADMIN: HCPCS | Performed by: INTERNAL MEDICINE

## 2019-03-12 PROCEDURE — 99213 OFFICE O/P EST LOW 20 MIN: CPT | Performed by: INTERNAL MEDICINE

## 2019-03-12 PROCEDURE — 1123F ACP DISCUSS/DSCN MKR DOCD: CPT | Performed by: INTERNAL MEDICINE

## 2019-03-12 PROCEDURE — 3017F COLORECTAL CA SCREEN DOC REV: CPT | Performed by: INTERNAL MEDICINE

## 2019-03-12 RX ORDER — NITROGLYCERIN 0.4 MG/1
TABLET SUBLINGUAL
Qty: 25 TABLET | Refills: 2 | Status: SHIPPED | OUTPATIENT
Start: 2019-03-12

## 2019-03-12 RX ORDER — PRASUGREL 10 MG/1
10 TABLET, FILM COATED ORAL DAILY
Qty: 90 TABLET | Refills: 3 | Status: SHIPPED | OUTPATIENT
Start: 2019-03-12

## 2019-03-12 RX ORDER — ATORVASTATIN CALCIUM 40 MG/1
40 TABLET, FILM COATED ORAL NIGHTLY
Qty: 90 TABLET | Refills: 3 | Status: SHIPPED | OUTPATIENT
Start: 2019-03-12 | End: 2021-10-26

## 2019-03-12 RX ORDER — METOPROLOL SUCCINATE 25 MG/1
25 TABLET, EXTENDED RELEASE ORAL DAILY
Qty: 90 TABLET | Refills: 3 | Status: SHIPPED | OUTPATIENT
Start: 2019-03-12

## 2019-03-12 ASSESSMENT — PATIENT HEALTH QUESTIONNAIRE - PHQ9
SUM OF ALL RESPONSES TO PHQ QUESTIONS 1-9: 0
SUM OF ALL RESPONSES TO PHQ QUESTIONS 1-9: 0
2. FEELING DOWN, DEPRESSED OR HOPELESS: 0
SUM OF ALL RESPONSES TO PHQ9 QUESTIONS 1 & 2: 0
1. LITTLE INTEREST OR PLEASURE IN DOING THINGS: 0

## 2019-03-17 ASSESSMENT — ENCOUNTER SYMPTOMS
TROUBLE SWALLOWING: 0
VOMITING: 0
VOICE CHANGE: 0
CHEST TIGHTNESS: 0
NAUSEA: 0
CHOKING: 0
SHORTNESS OF BREATH: 0
PHOTOPHOBIA: 0

## 2019-11-22 DIAGNOSIS — C61 PROSTATE CANCER (HCC): ICD-10-CM

## 2019-11-22 LAB — PROSTATE SPECIFIC ANTIGEN: 7.44 NG/ML (ref 0–5.4)

## 2019-12-02 ENCOUNTER — OFFICE VISIT (OUTPATIENT)
Dept: UROLOGY | Age: 69
End: 2019-12-02
Payer: MEDICARE

## 2019-12-02 VITALS
WEIGHT: 175 LBS | BODY MASS INDEX: 26.52 KG/M2 | HEIGHT: 68 IN | DIASTOLIC BLOOD PRESSURE: 80 MMHG | SYSTOLIC BLOOD PRESSURE: 120 MMHG | HEART RATE: 72 BPM

## 2019-12-02 DIAGNOSIS — C61 PROSTATE CANCER (HCC): Primary | ICD-10-CM

## 2019-12-02 PROCEDURE — 3017F COLORECTAL CA SCREEN DOC REV: CPT | Performed by: UROLOGY

## 2019-12-02 PROCEDURE — 4040F PNEUMOC VAC/ADMIN/RCVD: CPT | Performed by: UROLOGY

## 2019-12-02 PROCEDURE — 99213 OFFICE O/P EST LOW 20 MIN: CPT | Performed by: UROLOGY

## 2019-12-02 PROCEDURE — 1123F ACP DISCUSS/DSCN MKR DOCD: CPT | Performed by: UROLOGY

## 2019-12-02 PROCEDURE — G8599 NO ASA/ANTIPLAT THER USE RNG: HCPCS | Performed by: UROLOGY

## 2019-12-02 PROCEDURE — G8427 DOCREV CUR MEDS BY ELIG CLIN: HCPCS | Performed by: UROLOGY

## 2019-12-02 PROCEDURE — 1036F TOBACCO NON-USER: CPT | Performed by: UROLOGY

## 2019-12-02 PROCEDURE — G8484 FLU IMMUNIZE NO ADMIN: HCPCS | Performed by: UROLOGY

## 2019-12-02 PROCEDURE — G8417 CALC BMI ABV UP PARAM F/U: HCPCS | Performed by: UROLOGY

## 2019-12-02 RX ORDER — LISINOPRIL 2.5 MG/1
2.5 TABLET ORAL
Refills: 5 | COMMUNITY
Start: 2019-11-19

## 2019-12-18 ENCOUNTER — HOSPITAL ENCOUNTER (OUTPATIENT)
Dept: RADIATION ONCOLOGY | Age: 69
Discharge: HOME | End: 2019-12-18
Payer: MEDICARE

## 2019-12-18 VITALS
HEIGHT: 68 IN | RESPIRATION RATE: 16 BRPM | WEIGHT: 180 LBS | BODY MASS INDEX: 27.28 KG/M2 | SYSTOLIC BLOOD PRESSURE: 125 MMHG | TEMPERATURE: 98.9 F | HEART RATE: 62 BPM | DIASTOLIC BLOOD PRESSURE: 84 MMHG

## 2019-12-18 PROCEDURE — 99212 OFFICE O/P EST SF 10 MIN: CPT | Performed by: RADIOLOGY

## 2020-04-14 ENCOUNTER — TELEPHONE (OUTPATIENT)
Dept: UROLOGY | Age: 70
End: 2020-04-14

## 2020-06-03 DIAGNOSIS — C61 PROSTATE CANCER (HCC): ICD-10-CM

## 2020-06-03 LAB — PROSTATE SPECIFIC ANTIGEN: 11.17 NG/ML (ref 0–5.4)

## 2020-06-05 ENCOUNTER — VIRTUAL VISIT (OUTPATIENT)
Dept: UROLOGY | Age: 70
End: 2020-06-05
Payer: MEDICARE

## 2020-06-05 PROCEDURE — 99441 PR PHYS/QHP TELEPHONE EVALUATION 5-10 MIN: CPT | Performed by: UROLOGY

## 2020-06-05 NOTE — PROGRESS NOTES
[facility] EVALUATION NOTE                                                 H&P                                                                                                                                                 Reason for Visit  Rising PSA    History of Present Illness  Virtual visit over telephone  Patient at home  Dr. Fazal Gardner at office at [facility]  29-year-old male with history of low-grade low stage prostate cancer who is had a repeat biopsy after watchful waiting which showed following the first positive biopsy  Repeat biopsy did not show any more significant disease  Patient decided to continue with watchful waiting  The last 2 biopsies however shown a concerning jump  Current PSA is at 11.7  Patient now considering brachii therapy  He will contact Dr. Vitaliy Baez      Urologic Review of Systems/Symptoms  Denies hematuria  Denies dysuria  Denies incontinence  Denies flank pain  Other Urologic: Unchanged    Review of Systems  Head and neck: No issues/reviewed  Cardiac: No recent issues/reviewed  Pulmonary: No issues/reviewed  Gastrointestinal: No issues/reviewed  Neurologic: No recent issues/reviewed  Extremities: No issues/reviewed  Lymphatics: No lymphadenopathy no change  Genitourinary: See above  Skin: No issues/reviewed  Hospitalization: No change  No recent episodes  All 14 categories of Review of Systems otherwise reviewed no other findings reported.     Past Medical History:   Diagnosis Date    CAD (coronary artery disease)     1 stent 9-22-14    Colon polyps 2016    need repeat in 3 years, Dr Tracey Mccabe Diverticular disease 2016    Heart attack ([address] 09/22/2014    Hyperlipidemia     meds since MI 2014    Hypertension     meds since MI 2014    Prostate cancer ([facility] [address]     stage 1    Smoker     2014 / quit smoking after MI     Past Surgical History:   Procedure Laterality Date    COLONOSCOPY  05/13/16    Alcide Hodgkin MD    CORONARY ANGIOPLASTY WITH STENT PLACEMENT  2014    MS SONO GUIDE NEEDLE BIOPSY N/A 2018    TRUS BX performed by Marialuisa Dickens MD at [address] [place] [address] History     Socioeconomic History    Marital status: Single     Spouse name: None    Number of children: None    Years of education: None    Highest education level: None   Occupational History    None   Social Needs    Financial resource strain: None    Food insecurity     Worry: None     Inability: None    Transportation needs     Medical: None     Non-medical: None   Tobacco Use    Smoking status: Former Smoker     Packs/day: 2.00     Years: 40.00     Pack years: 80.00     Types: Cigarettes     Last attempt to quit: 2014     Years since quittin.7    Smokeless tobacco: Never Used    Tobacco comment: smoked pipe    Substance and Sexual Activity    Alcohol use: Yes     Comment: few glasses red wine a day and occassional beer    Drug use: No    Sexual activity: Not Currently     Comment: been years   Lifestyle    Physical activity     Days per week: None     Minutes per session: None    Stress: None   Relationships    Social connections     Talks on phone: None     Gets together: None     Attends [employer] service: None     Active member of club or organization: None     Attends meetings of clubs or organizations: None     Relationship status: None    Intimate partner violence     Fear of current or ex partner: None     Emotionally abused: None     Physically abused: None     Forced sexual activity: None   Other Topics Concern    None   Social History Narrative    None     Family History   Problem Relation Age of Onset    Cancer Mother         basal cell of face    Other Mother         Raynauds    COPD Father     Prostate Cancer Father     Stroke Sister     High Blood Pressure Sister     High Cholesterol Sister     High Blood Pressure Brother     Cancer Brother         throat cancer    Prostate Cancer Brother     Prostate Cancer Paternal Grandfather      Current Outpatient Medications this encounter. No orders of the defined types were placed in this encounter. Izaiah Lerner MD       Please note this report has been partially produced using speech recognition software  And may cause contain errors related to that system including grammar, punctuation and spelling as well as words and phrases that may seem inappropriate. If there are questions or concerns please feel free to contact me to clarify.

## 2020-06-29 ENCOUNTER — HOSPITAL ENCOUNTER (OUTPATIENT)
Dept: RADIATION ONCOLOGY | Age: 70
Discharge: HOME | End: 2020-06-29
Payer: MEDICARE

## 2020-07-15 ENCOUNTER — HOSPITAL ENCOUNTER (OUTPATIENT)
Dept: RADIATION ONCOLOGY | Age: 70
Discharge: HOME | End: 2020-07-15
Payer: MEDICARE

## 2020-07-15 PROCEDURE — 76873 ECHOGRAP TRANS R PROS STUDY: CPT | Performed by: RADIOLOGY

## 2020-07-15 PROCEDURE — 99213 OFFICE O/P EST LOW 20 MIN: CPT | Performed by: RADIOLOGY

## 2020-07-15 NOTE — ONCOLOGY
7/15/2020      Referring MD: Heraclio Regan MD              [facility]      RADIATION ONCOLOGY PROCEDURE NOTE: Prostate Ultrasound Volume Study    Patient Name:  Devang Duron Record #: [id number]  YOB: 1950    DIAGNOSIS: [id number] favorable intermediate risk prostate cancer, PSA 11.17, Ayo 3+3=6 without PNI, 2/12 cores+, 28.5cc at biopsy. PSA romaine under watchful waiting. INDICATION:  The prostate volume study is done to calculate the size and shape of the prostate gland for radiation dosimetry and treatment planning prior to permanent radioactive seed implantation. PROCEDURE: The patient was placed in the dorsal lithotomy position on the examination table. A Vincent catheter was placed in the bladder under sterile conditions, to visualize the urethra and bladder neck. The ultrasound probe was placed in the rectum and the prostate was readily visualized. Positioning was optimized to facilitate upcoming brachytherapy, achieving adequate symmetry and lack of distortion from the probe, and the angle of the probe was recorded. The prostate was scanned and images from the transverse planes were captured from base to apex in 5 mm increments. The prostate volume was calculated. Sagittal imaging was used to verify the length of the prostate. The pubic arches were assessed. RESULTS:  PROSTATE VOLUME (cc):  30.7cc     PROSTATE LENGTH (cm): 4cm     PROBE ANGLE: zero relative to table     PUBIC ARCH INTERFERENCE SIGNIFICANT: no         TREATMENT PLAN (monotherapy or boost): monotherapy    ISOTOPE: (I125 or Pd103): Iodine 125    METHOD: Preloaded stranded seeds, supplemented by free seeds placed via [employer]ator    PRESCRIPTION:     145 Gy peripheral dose    COMMENTS:  The patient tolerated the procedure well, and at completion the ultrasound probe and Vincent catheter were removed.   The volume and anatomy is favorable for seed implant brachytherapy, which is the patient's treatment

## 2020-07-16 PROCEDURE — 77470 SPECIAL RADIATION TREATMENT: CPT | Performed by: RADIOLOGY

## 2020-07-21 ENCOUNTER — HOSPITAL ENCOUNTER (OUTPATIENT)
Dept: RADIATION ONCOLOGY | Age: 70
Discharge: HOME | End: 2020-07-21
Attending: RADIOLOGY
Payer: MEDICARE

## 2020-08-03 ENCOUNTER — HOSPITAL ENCOUNTER (OUTPATIENT)
Dept: RADIATION ONCOLOGY | Age: 70
Discharge: HOME | End: 2020-08-03
Attending: RADIOLOGY
Payer: MEDICARE

## 2020-08-03 PROCEDURE — 77318 BRACHYTX ISODOSE COMPLEX: CPT | Performed by: RADIOLOGY

## 2020-08-12 ENCOUNTER — APPOINTMENT (OUTPATIENT)
Dept: RADIATION ONCOLOGY | Age: 70
End: 2020-08-12
Attending: RADIOLOGY
Payer: MEDICARE

## 2020-08-20 ENCOUNTER — APPOINTMENT (OUTPATIENT)
Dept: RADIATION ONCOLOGY | Age: 70
End: 2020-08-20
Attending: RADIOLOGY
Payer: MEDICARE

## 2020-08-20 ENCOUNTER — HOSPITAL ENCOUNTER (OUTPATIENT)
Dept: RADIATION ONCOLOGY | Age: 70
Discharge: HOME | End: 2020-08-20
Attending: RADIOLOGY
Payer: MEDICARE

## 2020-08-20 PROCEDURE — 76965 ECHO GUIDANCE RADIOTHERAPY: CPT | Performed by: RADIOLOGY

## 2020-08-20 PROCEDURE — 77318 BRACHYTX ISODOSE COMPLEX: CPT | Performed by: RADIOLOGY

## 2020-08-20 PROCEDURE — 77334 RADIATION TREATMENT AID(S): CPT | Performed by: RADIOLOGY

## 2020-08-20 PROCEDURE — C2638 BRACHYTX, STRANDED, I-125: HCPCS

## 2020-08-20 PROCEDURE — 77332 RADIATION TREATMENT AID(S): CPT | Performed by: RADIOLOGY

## 2020-08-20 PROCEDURE — 77778 APPLY INTERSTIT RADIAT COMPL: CPT | Performed by: RADIOLOGY

## 2020-08-20 PROCEDURE — 77370 RADIATION PHYSICS CONSULT: CPT | Performed by: RADIOLOGY

## 2020-08-20 NOTE — OP NOTE
Referring MD: Bulmaro Boston MD                    [facility]        RADIATION ONCOLOGY PROCEDURE NOTE: Transperineal Ultrasound Guided Iodine 125 Prostate Brachytherapy     Patient Name:  Teresa Goyal Record #: [id number]  YOB: 1950               PREOP DIAGNOSIS:  [id number] favorable intermediate risk prostate cancer, PSA 11.17, Ayo 3+3=6 without PNI, 2/12 cores+, 28.5cc at biopsy. PSA romaine under watchful waiting. POSTOP DIAGNOSIS: same     INDICATION:  Transperineal ultrasound guided prostate brachytherapy (seed implant). Seed implant as monotherapy. RADIATION ONCOLOGIST: Ceci Astudillo MD PhD     UROLOGIST: Bulmaro Boston MD     PHYSICIST: Meenakshi Marvin PhD     COMPLICATIONS: none     BLEEDING: minimal     PROCEDURE: The patient brought to the operating room and general anesthesia was administered. The patient was then placed in the dorsal lithotomy position. The perineum was then shaved and prepped in the usual fashion. A Vincent catheter was placed in the bladder under sterile conditions, with 10cc sterile saline in the balloon. The bladder was drained and then filled with 30cc, and the Vincent catheter was then clamped. The genitalia were mobilized out of the way, and secured in  position. The ultrasound probe was placed in the rectum and the prostate was readily visualized. Positioning was optimized to match the previous volume study, and good agreement was achieved. Two Navarro stabilization needles were placed. Starting with the anterior needles, we placed the sources sequentially using both axial and sagittal imaging to confirm placement. There was no pubic arch interference. All seeds were placed per the pre-plan with stranded needle sets, and no additional loose seeds were placed after ultrasound evaluation of the distribution and evaluation of intraoperative dosimetry. In total, 20 needles were used to implant 65 seeds with an activity per seed of 26mCi.  Following completion of the procedure we inspected the distribution of seeds and everything appeared in good condition. The [place] needles were removed. Dr. Nolan Cruz then performed a cystoscopy, and found no extraneous seeds in the bladder or urethra, and no pathology of concern. Medical Physics then surveyed the room and found no extraneous radioactivity. Radioactivity was measured at the skin surface of the perineum, and also at 1 m, and recorded. Unused seeds were returned to the . Volume: 30.7cc  Seeds: 65  Needles: 20  Activity per seed: 0.4mCi  Total Activity: 26mCi  Dose (MPD): 145Gy     Following the procedure the patient returned to the recovery room in good condition, without a catheter in place. He will be discharged once he fully recovers from anesthesia and is able to void after catheter removal. Oblique Xrays of the pelvis were ordered for verification of seed count. Plan: Return for f/u at the [facility] center for SpaceOAR gel placement. Additional f/u and CT for post-implant dosimetry in 1 month. No androgen ablation or external beam RT anticipated.     THIS REPORT HAS BEEN ELECTRONICALLY SIGNED:  8/20/2020  12:16 PM    Belén Chisholm MD

## 2020-08-24 ENCOUNTER — HOSPITAL ENCOUNTER (OUTPATIENT)
Dept: RADIATION ONCOLOGY | Age: 70
Discharge: HOME | End: 2020-08-24
Attending: RADIOLOGY
Payer: MEDICARE

## 2020-08-24 PROCEDURE — 99213 OFFICE O/P EST LOW 20 MIN: CPT | Performed by: RADIOLOGY

## 2020-08-24 PROCEDURE — 55874 TPRNL PLMT BIODEGRDABL MATRL: CPT | Performed by: RADIOLOGY

## 2020-08-24 NOTE — ONCOLOGY
8/24/2020        Referring MD: Franklin Pruitt MD                [facility]      RADIATION ONCOLOGY PROCEDURE NOTE: Prostate SpaceOAR gel placement    Patient Name:  Zachery Muniz Record #: [id number]  YOB: 1950    DIAGNOSIS: [id number] favorable intermediate risk prostate cancer, PSA 11.17, Ayo 3+3=6 without PNI, 2/12 cores+, 28.5cc at biopsy.  PSA romaine under watchful waiting. S/P transperineal ultrasound guided prostate brachytherapy, I125, 145Gy, implanted 65 seeds using 20 needles. He remains off of his anticoagulation, and will resume tomorrow.     PROCEDURE: The patient was placed in the dorsal lithotomy position on the examination table. The genitalia were mobilized out of the way, and I placed topical Emla cream over the perineum to reduce skin sensitivity. We then placed a rectal probe and positioned it so the prostate was easily visualized. The perineum was then sterilized with Betadine. I anesthetized the perineum with 2% Lidocaine for injection with Epinephrine injecting a total of 20cc of that solution along the expected needle tracts and near the pudendal nerves. We allowed 10 minutes for local anesthesia to set in. A needle was then positioned in the argenis-prostatic fat between the prostate and rectum, and 5cc of sterile normal saline was injected to separate the tissue planes, done under sagittal and axial imaging. After verifying positioning of the needle tip at the midpoint of the prostate, 10cc of SpaceOAR reagents were injected over a 10 second time span, while observing under sagittal ultrasound. The distribution of gel was inspected by ultrasound after completion. The patient had no difficulties with the procedure. When finished, the ultrasound probe was removed and we cleaned the perineum and released the patient. The patient remains on Levaquin. He will be seen again in a few weeks for f/u and post-implant CT based quantitative dosimetry.        THIS REPORT HAS BEEN ELECTRONICALLY SIGNED:  8/24/2020  3:43 PM    Jairon Santos MD

## 2020-08-26 ENCOUNTER — APPOINTMENT (OUTPATIENT)
Dept: RADIATION ONCOLOGY | Age: 70
End: 2020-08-26
Attending: RADIOLOGY
Payer: MEDICARE

## 2020-09-16 ENCOUNTER — HOSPITAL ENCOUNTER (OUTPATIENT)
Dept: RADIATION ONCOLOGY | Age: 70
Discharge: HOME | End: 2020-09-16
Attending: RADIOLOGY
Payer: MEDICARE

## 2020-09-16 VITALS
TEMPERATURE: 97 F | RESPIRATION RATE: 16 BRPM | WEIGHT: 177.5 LBS | DIASTOLIC BLOOD PRESSURE: 70 MMHG | HEART RATE: 58 BPM | OXYGEN SATURATION: 98 % | SYSTOLIC BLOOD PRESSURE: 112 MMHG | BODY MASS INDEX: 26.99 KG/M2

## 2020-09-16 PROCEDURE — 99212 OFFICE O/P EST SF 10 MIN: CPT | Performed by: RADIOLOGY

## 2020-09-16 PROCEDURE — 77290 THER RAD SIMULAJ FIELD CPLX: CPT | Performed by: RADIOLOGY

## 2020-09-16 RX ORDER — TAMSULOSIN HYDROCHLORIDE 0.4 MG/1
0.4 CAPSULE ORAL DAILY
Qty: 90 CAPSULE | Refills: 1 | Status: SHIPPED | OUTPATIENT
Start: 2020-09-16 | End: 2020-11-19 | Stop reason: ALTCHOICE

## 2020-09-16 NOTE — ONCOLOGY
Smokeless Tobacco Never Used   Tobacco Comment    smoked pipe      Social History     Substance and Sexual Activity   Alcohol Use Yes    Comment: few glasses red wine a day and occassional beer       ALLERGIES:   Allergies   Allergen Reactions    Ciprofloxacin Other (See Comments)     Muscle cramp, tightness    Penicillins     Sulfamethoxazole-Trimethoprim      Muscle cramps and body aches        CURRENT MEDICATIONS:     Prior to Admission medications    Medication Sig Start Date End Date Taking? Authorizing Provider   tamsulosin (FLOMAX) 0.4 MG capsule Take 1 capsule by mouth daily Best taken 1/2 hour after a meal. 9/16/20  Yes Jia Nurse, MD   lisinopril (PRINIVIL;ZESTRIL) 2.5 MG tablet TAKE 1 TABLET BY MOUTH ONCE DAILY 11/19/19   Historical Provider, MD   metoprolol succinate (TOPROL XL) 25 MG extended release tablet Take 1 tablet by mouth daily 3/12/19   Ehsan Black MD   atorvastatin (LIPITOR) 40 MG tablet Take 1 tablet by mouth nightly 3/12/19   Ehsan Black MD   prasugrel (EFFIENT) 10 MG TABS Take 1 tablet by mouth daily 3/12/19   Ehsan Black MD   nitroGLYCERIN (NITROSTAT) 0.4 MG SL tablet Place 1 tablet under the tongue every 5 minutes as needed for Chest pain 3/12/19   Ehsan Black MD   glucosamine-chondroitin 500-400 MG CAPS Take by mouth daily    Historical Provider, MD   aspirin (ASPIR-LOW) 81 MG EC tablet Take 1 tablet by mouth daily 3/8/17   Ehsan Black MD       I have personally reviewed and reconciled the medications listed. Review Of Systems:   Pain Score:   Pain Score (1-10): 0   General: No Problems  Patient has gained weight []? Yes   [x]? No  Patient has lost weight [x]? Yes   []? No  How much weight in pounds and over what length of time:    Eyes (Ophthalmic): No Problem              Skin (Dermatological): No Problems              ENT: No Problems              Respiratory: No Problems              Cardiovascular: No Problems                          Device   []? Yes   [x]?  No Copy of Card Obtained []? Yes   [x]? No   Gastrointestinal: No Problems   Genito-Urinary: IPSS 17, up from 13. Nocturia x 1, up from zero. No dysuria, no hematuria. Difficulty Urinating- has an urgency at times with a weak stream                          Nocturia times 1                        Incontinence- none in the last 2 weeks              Breast: No Problems              Musculoskeletal: Joint Pain   Neurological: No Problems                          Hematological and Lymphatic: No Problems              Endocrine: No Problems   A 10-point review of systems has been conducted and pertinent positives have been   recorded. All other review of systems are negative. ECOG PERFORMANCE STATUS: 0    VITAL SIGNS:    There were no vitals filed for this visit. PHYSICAL EXAMINATION:  GENERAL: No acute distress. Alert, oriented, cooperative. HEENT:  PERRLA, EOMI. Oral cavity WNL. NECK:  No palpable cervical or supraclavicular adenopathy. CHEST/LUNGS: CTA  CARDIOVASCULAR:  RRR, no audible murmur  ABDOMEN: Benign  EXTREMITIES: No C/C/E   RECTAL: Deferred. Recent radioactive implant. STUDIES: None    IMPRESSION/PLAN: Minimal symptoms related to recent brachytherapy. Too soon to assess disease response. The patient does have a slow stream and some difficulty initiating the stream, so I placed him on Flomax today. He has tolerated this medication well in the past.  I plan to see him back in 2 months, which should coincide with the peak of his side effects associated with the seed implant. He will see Dr. Hortencia Velasquez in a couple of weeks, and PSA will be drawn at Urology. Electronically signed by Noel Irizarry MD on 9/16/20 at 12:25 PM EDT      Thank you for allowing us to participate in the care of this patient.   cc:   MD Glory Lucia MD Kandace Fry, [address]

## 2020-09-16 NOTE — PROGRESS NOTES
NURSING ASSESSMENT     Date: 9/16/2020        Patient Name: Judge Anderson     YOB: 1950      Age:  78 y.o. MRN: [id number]     Chaperone [] Yes   [x] No      Advance Directives:   Do you currently have completed advance directives (living will)? [x] Yes   [] No         *If yes, please bring us a copy for your records. *If no, would you like info or assistance in completing advance directives (living will)? [] Yes   [x] No    Pain Score:   Pain Score (1-10): 0             General: No Problems  Patient has gained weight [] Yes   [x] No  Patient has lost weight [x] Yes   [] No  How much weight in pounds and over what length of time:     Eyes (Ophthalmic): No Problem     Skin (Dermatological): No Problems     ENT: No Problems     Respiratory: No Problems     Cardiovascular: No Problems      Device   [] Yes   [x] No   Copy of Card Obtained [] Yes   [x] No    Gastrointestinal: No Problems    Genito-Urinary: No Problems   No Problems      Difficulty Urinating- has an urgency at times with a weak stream      Nocturia times 1    Incontinence- none in the last 2 weeks          Breast: No Problems     Musculoskeletal: Joint Pain    Neurological: No Problems      Hematological and Lymphatic: No Problems     Endocrine: No Problems         A 10-point review of systems has been conducted and pertinent positives have been   recorded. All other review of systems are negative    Was the patient admitted during the course of treatment OR within 30 days of treatment?          Additional Comments:

## 2020-09-17 PROCEDURE — 77295 3-D RADIOTHERAPY PLAN: CPT | Performed by: RADIOLOGY

## 2020-09-21 ENCOUNTER — VIRTUAL VISIT (OUTPATIENT)
Dept: UROLOGY | Age: 70
End: 2020-09-21
Payer: MEDICARE

## 2020-09-21 PROCEDURE — 99441 PR PHYS/QHP TELEPHONE EVALUATION 5-10 MIN: CPT | Performed by: UROLOGY

## 2020-09-21 NOTE — PROGRESS NOTES
Urology  Postop check after radioactive seed implant  Patient having no issues urinating  Virtual visit over telephone patient at home Dr Collins Dakin the office  PSA in 6 months with virtual visit  Greater than 5 less than 10 minutes virtual visit over telephone  Aliza Schuler MD

## 2020-11-19 ENCOUNTER — HOSPITAL ENCOUNTER (OUTPATIENT)
Dept: RADIATION ONCOLOGY | Age: 70
Discharge: HOME | End: 2020-11-19
Attending: RADIOLOGY
Payer: MEDICARE

## 2020-11-19 VITALS
BODY MASS INDEX: 27.43 KG/M2 | RESPIRATION RATE: 16 BRPM | WEIGHT: 180.4 LBS | OXYGEN SATURATION: 100 % | HEART RATE: 63 BPM | DIASTOLIC BLOOD PRESSURE: 76 MMHG | TEMPERATURE: 97.6 F | SYSTOLIC BLOOD PRESSURE: 135 MMHG

## 2020-11-19 PROCEDURE — 99212 OFFICE O/P EST SF 10 MIN: CPT | Performed by: RADIOLOGY

## 2020-11-19 NOTE — ONCOLOGY
MD    CORONARY ANGIOPLASTY WITH STENT PLACEMENT      OR SONO GUIDE NEEDLE BIOPSY N/A 2018    TRUS BX performed by Emmie Rahman MD at [address] History     Tobacco Use   Smoking Status Former Smoker    Packs/day: 2.00    Years: 40.00    Pack years: 80.00    Types: Cigarettes    Last attempt to quit: 2014    Years since quittin.1   Smokeless Tobacco Never Used   Tobacco Comment    smoked pipe      Social History     Substance and Sexual Activity   Alcohol Use Yes    Comment: few glasses red wine a day and occassional beer       ALLERGIES:   Allergies   Allergen Reactions    Ciprofloxacin Other (See Comments)     Muscle cramp, tightness    Penicillins     Sulfamethoxazole-Trimethoprim      Muscle cramps and body aches        CURRENT MEDICATIONS:     Prior to Admission medications    Medication Sig Start Date End Date Taking? Authorizing Provider   lisinopril (PRINIVIL;ZESTRIL) 2.5 MG tablet TAKE 1 TABLET BY MOUTH ONCE DAILY 19  Yes Historical Provider, MD   metoprolol succinate (TOPROL XL) 25 MG extended release tablet Take 1 tablet by mouth daily 3/12/19  Yes Ac Moy MD   atorvastatin (LIPITOR) 40 MG tablet Take 1 tablet by mouth nightly 3/12/19  Yes Ac Moy MD   prasugrel (EFFIENT) 10 MG TABS Take 1 tablet by mouth daily 3/12/19  Yes Ac Moy MD   glucosamine-chondroitin 500-400 MG CAPS Take by mouth daily   Yes Historical Provider, MD   aspirin (ASPIR-LOW) 81 MG EC tablet Take 1 tablet by mouth daily 3/8/17  Yes Ac Moy MD   nitroGLYCERIN (NITROSTAT) 0.4 MG SL tablet Place 1 tablet under the tongue every 5 minutes as needed for Chest pain  Patient not taking: Reported on 2020 3/12/19   Ac Moy MD     I have personally reviewed and reconciled the medications listed. Review Of Systems:   Pain Score:   Pain Score (1-10): 0   General: No Problems  Patient has gained weight []? Yes   [x]? No  Patient has lost weight []? Yes   [x]? No  How much weight in pounds and over what length of time:     Eyes (Ophthalmic): No Problem              Skin (Dermatological): No Problems              ENT: No Problems              Respiratory: No Problems              Cardiovascular: No Problems                          Device   []? Yes   [x]? No              Copy of Card Obtained []? Yes   [x]? No   Gastrointestinal: Had diarrhea while taking flomax, now just having loose stools   Genito-Urinary: IPSS 17, with nocturia x 1. Weak stream, frequency. On detailed questioning, the patient notes no significant change in comparison to pretreatment. The change in IPSS reflects a difference in how he interprets the questionnaire.            Breast: No Problems              Musculoskeletal: No Problems   Neurological: No Problems                          Hematological and Lymphatic: Easy Bruising, patient takes effient              Endocrine: No Problems   A 10-point review of systems has been conducted and pertinent positives have been   recorded. All other review of systems are negative. ECOG PERFORMANCE STATUS: 0    VITAL SIGNS:    Vitals:    11/19/20 1005   BP: 135/76   Pulse: 63   Resp: 16   Temp: 97.6 °F (36.4 °C)   SpO2: 100%   Weight: 180 lb 6.4 oz (81.8 kg)     PHYSICAL EXAMINATION:  GENERAL: No acute distress. Alert, oriented, cooperative. Covid mask in place. HEENT:  PERRLA, EOMI. Oral cavity WNL. NECK:  No cervical or supraclavicular adenopathy. CHEST/LUNGS: CTA, no rib or spine tenderness. CARDIOVASCULAR:  RRR, no audible murmur  ABDOMEN: Soft, nontender, nondistended, no bladder distention. EXTREMITIES: No C/C/E   RECTAL: Deferred. Recent brachytherapy. STUDIES: none    IMPRESSION/PLAN:    No change in urinary symptoms in comparison to baseline, at 3 months out from I-125 brachytherapy. This time point should coincide with the peak of his anticipated side effects, so it is reassuring that he has no real complaints today.   He will remain off Flomax, as he seems to have had some problems with diarrhea on that medication. This is improving. He is seeing Dr. Narayan Richland in a few months with a PSA, and I will see him back in 1 year. Document produced with the aid of voice recognition software, and errors generated by such software may not all have been detected and corrected. Electronically signed by Dina Marrero MD on 11/19/20 at 12:14 PM EST      Thank you for allowing us to participate in the care of this patient.   cc:   MD Rosaline Reis MD Aletha Rias, [address]

## 2020-11-19 NOTE — PROGRESS NOTES
NURSING ASSESSMENT     Date: 11/19/2020        Patient Name: Elida Richards     YOB: 1950      Age:  78 y.o. MRN: [id number]     Chaperone [] Yes   [x] No      Advance Directives:   Do you currently have completed advance directives (living will)? [x] Yes   [] No         *If yes, please bring us a copy for your records. *If no, would you like info or assistance in completing advance directives (living will)? [] Yes   [x] No    Pain Score:   Pain Score (1-10): 0      General: No Problems  Patient has gained weight [] Yes   [x] No  Patient has lost weight [] Yes   [x] No  How much weight in pounds and over what length of time:     Eyes (Ophthalmic): No Problem     Skin (Dermatological): No Problems     ENT: No Problems     Respiratory: No Problems     Cardiovascular: No Problems      Device   [] Yes   [x] No   Copy of Card Obtained [] Yes   [x] No    Gastrointestinal: Had diarrhea while taking flomax, now just having loose stools    Genito-Urinary: See IPSS sheet       Breast: No Problems     Musculoskeletal: No Problems    Neurological: No Problems      Hematological and Lymphatic: Easy Bruising, patient takes effient     Endocrine: No Problems         A 10-point review of systems has been conducted and pertinent positives have been   recorded. All other review of systems are negative    Was the patient admitted during the course of treatment OR within 30 days of treatment?  No    I    Additional Comments:

## 2021-04-21 DIAGNOSIS — C61 PROSTATE CANCER (HCC): ICD-10-CM

## 2021-04-21 LAB — PROSTATE SPECIFIC ANTIGEN: 0.64 NG/ML (ref 0–6.22)

## 2021-04-26 ENCOUNTER — VIRTUAL VISIT (OUTPATIENT)
Dept: UROLOGY | Age: 71
End: 2021-04-26
Payer: MEDICARE

## 2021-04-26 DIAGNOSIS — C61 PROSTATE CANCER (HCC): Primary | ICD-10-CM

## 2021-04-26 PROCEDURE — 99441 PR PHYS/QHP TELEPHONE EVALUATION 5-10 MIN: CPT | Performed by: UROLOGY

## 2021-04-26 NOTE — PROGRESS NOTES
Urology  Virtual visit over telephone patient at home Dr Nica Viveros the office secondary to COVID-19 pandemic  History of prostate cancer status post brachytherapy  Current PSA is 0.64  Minimal obstructive voiding symptoms  Plan is to check another PSA in 6 months have a virtual visit  PSA 1 year with follow-up in the office  Greater than 5 less than 10-minute visit over telephone virtual visit  Davide Reaves MD

## 2021-10-19 DIAGNOSIS — C61 PROSTATE CANCER (HCC): ICD-10-CM

## 2021-10-19 LAB — PROSTATE SPECIFIC ANTIGEN: 0.98 NG/ML (ref 0–4)

## 2021-10-26 ENCOUNTER — VIRTUAL VISIT (OUTPATIENT)
Dept: UROLOGY | Age: 71
End: 2021-10-26
Payer: MEDICARE

## 2021-10-26 DIAGNOSIS — C61 PROSTATE CANCER (HCC): Primary | ICD-10-CM

## 2021-10-26 PROCEDURE — 99421 OL DIG E/M SVC 5-10 MIN: CPT | Performed by: UROLOGY

## 2021-10-26 RX ORDER — ATORVASTATIN CALCIUM 80 MG/1
TABLET, FILM COATED ORAL
COMMUNITY
Start: 2021-10-18

## 2021-10-26 NOTE — PROGRESS NOTES
Urology  Prostate cancer follow-up with PSA  Virtual visit over telephone secondary to COVID-19 pandemic  42-year-old male who underwent radioactive seed implantation after being diagnosed with prostate cancer 2 cores out of 12 showing [place] score 3+3 adenocarcinoma with initial PSA of 4.9 prostate volume of 28.5 cc  Patient underwent radioactive seed implant on 8/20/2020  His PSAs have been under good control  Slight increase of the PSA over the last year still under 1 at 0.98  PSA 6 months ago was 0.64  Our plan is to check a PSA's in 6 months or so waiting a full year  Patient having no voiding issues  Patient is on board with the plan  He requests another virtual visit in 6 months  Greater than 5 less than 10-minute virtual visit over telephone  Larry Stokes MD

## 2021-11-16 ENCOUNTER — HOSPITAL ENCOUNTER (OUTPATIENT)
Dept: RADIATION ONCOLOGY | Age: 71
Discharge: HOME | End: 2021-11-16
Attending: RADIOLOGY
Payer: MEDICARE

## 2021-11-16 VITALS
TEMPERATURE: 97.3 F | HEART RATE: 60 BPM | OXYGEN SATURATION: 98 % | BODY MASS INDEX: 27.52 KG/M2 | DIASTOLIC BLOOD PRESSURE: 68 MMHG | RESPIRATION RATE: 16 BRPM | SYSTOLIC BLOOD PRESSURE: 119 MMHG | WEIGHT: 181 LBS

## 2021-11-16 PROCEDURE — 99214 OFFICE O/P EST MOD 30 MIN: CPT | Performed by: RADIOLOGY

## 2021-11-16 PROCEDURE — 99212 OFFICE O/P EST SF 10 MIN: CPT | Performed by: RADIOLOGY

## 2021-11-16 NOTE — PROGRESS NOTES
NURSING ASSESSMENT     Date: 11/16/2021        Patient Name: Sonia Mcgarry     YOB: 1950      Age:  73 y.o. MRN: [id number]       Chaperone [] Yes   [x] No      Advance Directives:  Do you currently have completed advance directives (living will)? [x] Yes   [] No         *If yes, please bring us a copy for your records. *If no, would you like info or assistance in completing advance directives (living will)? [] Yes   [x] No    Pain Score:   Pain Score (1-10): none today  Pain Location:  Lower back  Pain Duration:  intermittent  Pain Management/Control: stretching helps      Is pain affecting your ability to take care of yourself or move throughout your home? [] Yes   [x] No    General: No Problems  Patient has gained weight [] Yes   [x] No  Patient has lost weight [] Yes   [x] No  How much weight in pounds and over what length of time:     Eyes (Ophthalmic): No Problem     Skin (Dermatological): No Problems     ENT: No Problems     Respiratory: No Problems     Cardiovascular: No Problems      Device   [] Yes   [x] No   Copy of Card Obtained [] Yes   [x] No    Gastrointestinal: No Problems    Genito-Urinary: No urinary complaints     Breast: No Problems     Musculoskeletal: Back Pain on occasion, feels like a stiffness    Neurological: No Problems      Hematological and Lymphatic: Easy Bruising     Endocrine: No Problems     A 10-point review of systems has been conducted and pertinent positives have been   recorded. All other review of systems are negative    Was the patient admitted during the course of treatment OR within 30 days of treatment?  no

## 2021-11-19 ASSESSMENT — ENCOUNTER SYMPTOMS
RESPIRATORY NEGATIVE: 1
EYES NEGATIVE: 1
GASTROINTESTINAL NEGATIVE: 1
ALLERGIC/IMMUNOLOGIC NEGATIVE: 1

## 2021-11-19 NOTE — PROGRESS NOTES
[facility]           Radiation Oncology      [address] [facility]        [address]        Ector Kin: [phone]        F: [phone]       [email or web address]                   Dr. Mya Flynn MD PhD    FOLLOW-UP NOTE     Date of Service: 2021  Patient ID: Sadia Celestin   : 1950  MRN: [id number]   Acct Number: [de-identified]     2021    NAME:  Sadia Celestin    :  1950 73 y.o. male     PCP: Caden Amezquita MD    REFERRING PROVIDER: Charlie Dugan    DIAGNOSIS:  [id number] favorable intermediate risk prostate cancer, PSA 11.17, Ayo 3+3=6 without PNI, 2/12 cores+, 28.5cc at biopsy.  PSA romaine under watchful waiting.     STAGING: Cancer Staging  Prostate cancer [facility])  Staging form: Prostate, AJCC 8th Edition  - Clinical stage from 2019: Stage I (cT2a, cN0, cM0, PSA: 7.4, Grade Group: 1) - Signed by Radha Diaz MD on 2019      PRIOR TREATMENT: Transperineal ultrasound guided prostate brachytherapy, Iodine 125, 145Gy, implanted 65 seeds using 20 needles.  Implant date 20. SpaceOAR gel placed 20. TIME SINCE TREATMENT:  15 months    RECENT HISTORY: Sadia Celestin returns for follow up status post completion of definitive radiation therapy with iodine 125 low-dose rate intraprostatic brachytherapy permanent implant. He continues to do well from a urinary symptoms perspective and today notes an AUA score of 0. He does have some lower back pain that he reports is chronic but recently flared when he was exerting himself. He denies any other symptoms at this time. His PSA today was 0.98, slightly up from 0.64 on 2021. Past medical, surgical, social and family histories reviewed and updated as indicated. ALLERGIES:  Ciprofloxacin, Penicillins, and Sulfamethoxazole-trimethoprim       MEDICATIONS:   Current Outpatient Medications:     atorvastatin (LIPITOR) 80 MG tablet, Take 1/2 tablet by mouth once daily. , Disp: , Rfl:     lisinopril (PRINIVIL;ZESTRIL) 2.5 MG tablet, TAKE 1 TABLET BY MOUTH ONCE DAILY, Disp: , Rfl: 5    metoprolol succinate (TOPROL XL) 25 MG extended release tablet, Take 1 tablet by mouth daily, Disp: 90 tablet, Rfl: 3    prasugrel (EFFIENT) 10 MG TABS, Take 1 tablet by mouth daily, Disp: 90 tablet, Rfl: 3    glucosamine-chondroitin 500-400 MG CAPS, Take by mouth daily, Disp: , Rfl:     aspirin (ASPIR-LOW) 81 MG EC tablet, Take 1 tablet by mouth daily, Disp: 30 tablet, Rfl: 11    nitroGLYCERIN (NITROSTAT) 0.4 MG SL tablet, Place 1 tablet under the tongue every 5 minutes as needed for Chest pain (Patient not taking: Reported on 10/26/2021), Disp: 25 tablet, Rfl: 2    Review of Systems   Constitutional: Negative. HENT: Negative. Eyes: Negative. Respiratory: Negative. Cardiovascular: Negative. Gastrointestinal: Negative. Endocrine: Negative. Genitourinary: Negative. Musculoskeletal: Negative. Skin: Negative. Allergic/Immunologic: Negative. Neurological: Negative. Hematological: Negative. Psychiatric/Behavioral: Negative. PHYSICAL EXAMINATION:      Vitals:    11/16/21 1337   BP: 119/68   Pulse: 60   Resp: 16   Temp: 97.3 °F (36.3 °C)   SpO2: 98%   Weight: 181 lb (82.1 kg)       Wt Readings from Last 3 Encounters:   11/16/21 181 lb (82.1 kg)   11/19/20 180 lb 6.4 oz (81.8 kg)   09/16/20 177 lb 8 oz (80.5 kg)       ECOG PERFORMANCE STATUS:  0    Physical Exam  Vitals and nursing note reviewed. Constitutional:       Appearance: Normal appearance. Comments: He is present unaccompanied   HENT:      Head: Normocephalic and atraumatic. Eyes:      General: No scleral icterus. Extraocular Movements: Extraocular movements intact. Cardiovascular:      Heart sounds: Normal heart sounds. Pulmonary:      Breath sounds: Normal breath sounds. Abdominal:      Palpations: Abdomen is soft.    Genitourinary:     Comments: Digital rectal examination revealed a relatively smooth prostate with a small nodule palpable in the right gland. There was no blood in the examination finger. Musculoskeletal:         General: No swelling. Normal range of motion. Cervical back: Normal range of motion and neck supple. Right lower leg: No edema. Left lower leg: No edema. Skin:     General: Skin is warm and dry. Neurological:      General: No focal deficit present. Mental Status: He is alert and oriented to person, place, and time. Psychiatric:         Mood and Affect: Mood normal.         Behavior: Behavior normal.          ASSESSMENT/PLAN: This is a 24-year-old gentleman with a history of  [id number] favorable intermediate risk prostate cancer, PSA 11.17, Ayo 3+3=6 without PNI, 2/12 cores+, 28.5cc at biopsy.  PSA romaine under watchful waiting.  He underwent a iodine-125 permanent prostate seed implant in August 2020 and returns for routine follow-up approximately 15 months after completing radiation therapy. He denies any urinary symptoms at this time and also denies any bowel changes. He does have some chronic low back pain that is recently flared when he was exerting himself and notes that this has been an issue for some time. PSA showed a slight rise of 0.98. I discussed with the patient that there is a possibility for a PSA bounce generally within 1 to 2 years after completing radiation therapy. Since he is in this timeframe, I will repeat a PSA in 6 months and have him return to clinic for a formal follow-up in conjunction with him seeing Dr. Raffi La with urology. Should his PSA continue to rise I will coordinate with urology for a repeat biopsy at that time. The patient was amenable to the plan in full agreement. All questions were answered. He will return to see us in 6 months or sooner if clinically indicated.       Analia Webb MD PhD  Radiation Oncologist, [address] [place] [address]

## 2021-11-30 ENCOUNTER — VIRTUAL VISIT (OUTPATIENT)
Dept: FAMILY MEDICINE CLINIC | Age: 71
End: 2021-11-30
Payer: MEDICARE

## 2021-11-30 DIAGNOSIS — I25.10 CORONARY ARTERY DISEASE INVOLVING NATIVE HEART, UNSPECIFIED VESSEL OR LESION TYPE, UNSPECIFIED WHETHER ANGINA PRESENT: Primary | ICD-10-CM

## 2021-11-30 DIAGNOSIS — E78.2 MODERATE MIXED HYPERLIPIDEMIA NOT REQUIRING STATIN THERAPY: ICD-10-CM

## 2021-11-30 DIAGNOSIS — I10 ESSENTIAL HYPERTENSION: ICD-10-CM

## 2021-11-30 DIAGNOSIS — R79.9 ABNORMAL FINDING OF BLOOD CHEMISTRY, UNSPECIFIED: ICD-10-CM

## 2021-11-30 DIAGNOSIS — C61 PROSTATE CANCER (HCC): ICD-10-CM

## 2021-11-30 PROCEDURE — 1036F TOBACCO NON-USER: CPT | Performed by: INTERNAL MEDICINE

## 2021-11-30 PROCEDURE — 1123F ACP DISCUSS/DSCN MKR DOCD: CPT | Performed by: INTERNAL MEDICINE

## 2021-11-30 PROCEDURE — 99214 OFFICE O/P EST MOD 30 MIN: CPT | Performed by: INTERNAL MEDICINE

## 2021-11-30 PROCEDURE — G8484 FLU IMMUNIZE NO ADMIN: HCPCS | Performed by: INTERNAL MEDICINE

## 2021-11-30 PROCEDURE — 3017F COLORECTAL CA SCREEN DOC REV: CPT | Performed by: INTERNAL MEDICINE

## 2021-11-30 PROCEDURE — G8427 DOCREV CUR MEDS BY ELIG CLIN: HCPCS | Performed by: INTERNAL MEDICINE

## 2021-11-30 PROCEDURE — G8417 CALC BMI ABV UP PARAM F/U: HCPCS | Performed by: INTERNAL MEDICINE

## 2021-11-30 PROCEDURE — 4040F PNEUMOC VAC/ADMIN/RCVD: CPT | Performed by: INTERNAL MEDICINE

## 2021-11-30 SDOH — ECONOMIC STABILITY: FOOD INSECURITY: WITHIN THE PAST 12 MONTHS, YOU WORRIED THAT YOUR FOOD WOULD RUN OUT BEFORE YOU GOT MONEY TO BUY MORE.: NEVER TRUE

## 2021-11-30 SDOH — ECONOMIC STABILITY: FOOD INSECURITY: WITHIN THE PAST 12 MONTHS, THE FOOD YOU BOUGHT JUST DIDN'T LAST AND YOU DIDN'T HAVE MONEY TO GET MORE.: NEVER TRUE

## 2021-11-30 ASSESSMENT — ENCOUNTER SYMPTOMS
CONSTIPATION: 0
SHORTNESS OF BREATH: 0
EYE DISCHARGE: 0
TROUBLE SWALLOWING: 0
ABDOMINAL PAIN: 0
SORE THROAT: 0
EYE REDNESS: 0
COUGH: 0
BACK PAIN: 0
EYE PAIN: 0
FACIAL SWELLING: 0
NAUSEA: 0
RECTAL PAIN: 0
CHEST TIGHTNESS: 0
WHEEZING: 0
DIARRHEA: 0
COLOR CHANGE: 0
VOMITING: 0
EYE ITCHING: 0
RHINORRHEA: 0
BLOOD IN STOOL: 0
SINUS PAIN: 0
SINUS PRESSURE: 0
PHOTOPHOBIA: 0
VOICE CHANGE: 0
APNEA: 0
ABDOMINAL DISTENTION: 0

## 2021-11-30 ASSESSMENT — PATIENT HEALTH QUESTIONNAIRE - PHQ9
1. LITTLE INTEREST OR PLEASURE IN DOING THINGS: 0
SUM OF ALL RESPONSES TO PHQ QUESTIONS 1-9: 0
2. FEELING DOWN, DEPRESSED OR HOPELESS: 0
SUM OF ALL RESPONSES TO PHQ9 QUESTIONS 1 & 2: 0
SUM OF ALL RESPONSES TO PHQ QUESTIONS 1-9: 0
SUM OF ALL RESPONSES TO PHQ QUESTIONS 1-9: 0

## 2021-11-30 ASSESSMENT — SOCIAL DETERMINANTS OF HEALTH (SDOH): HOW HARD IS IT FOR YOU TO PAY FOR THE VERY BASICS LIKE FOOD, HOUSING, MEDICAL CARE, AND HEATING?: NOT HARD AT ALL

## 2021-11-30 NOTE — PROGRESS NOTES
Subjective:      Patient ID: Sj Saez is a 73 y.o. male New patient, here for evaluation of the following chief complaint(s):  Chief Complaint   Patient presents with   [facility] Established New Doctor       EULOGIO Riveraangie Nguyen was seen today for established new doctort:    Coronary artery disease involving native heart, unspecified vessel or lesion type, unspecified whether angina presentcompliant with aspirin 81 mg orally daily, Effient 10 mg orally daily and metoprolol 25 mg orally daily    Essential hypertensioncompliant with Zestril 2.5 mg orally daily    Prostate cancer ([facility] [address]): strong family history    Moderate mixed hyperlipidemia not requiring statin therapy       S/p brachy therapy    At present he denies polyuria,  Polydipsia, constitutional, sinus, visual, cardiopulmonary, urologic, gastrointestinal, immunologic/hematologic, musculoskeletal, neurologic,dermatologic, or psychiatric complaints. Review of Systems   Constitutional: Negative for chills, diaphoresis, fatigue and fever. HENT: Negative for congestion, dental problem, drooling, ear discharge, ear pain, facial swelling, hearing loss, mouth sores, nosebleeds, postnasal drip, rhinorrhea, sinus pressure, sinus pain, sneezing, sore throat, tinnitus, trouble swallowing and voice change. Eyes: Negative for photophobia, pain, discharge, redness, itching and visual disturbance. Respiratory: Negative for apnea, cough, chest tightness, shortness of breath and wheezing. Cardiovascular: Negative for chest pain, palpitations and leg swelling. Gastrointestinal: Negative for abdominal distention, abdominal pain, blood in stool, constipation, diarrhea, nausea, rectal pain and vomiting. Endocrine: Negative for cold intolerance, heat intolerance, polydipsia, polyphagia and polyuria. Genitourinary: Negative for decreased urine volume, difficulty urinating, dysuria, flank pain, frequency, genital sores, hematuria and urgency.    Musculoskeletal: Negative for arthralgias, back pain, gait problem, joint swelling, myalgias, neck pain and neck stiffness. Skin: Negative for color change, rash and wound. Allergic/Immunologic: Negative for environmental allergies and food allergies. Neurological: Negative for dizziness, tremors, seizures, syncope, facial asymmetry, speech difficulty, weakness, light-headedness, numbness and headaches. Hematological: Negative for adenopathy. Does not bruise/bleed easily. Psychiatric/Behavioral: Negative for agitation, confusion, decreased concentration, hallucinations, self-injury, sleep disturbance and suicidal ideas. The patient is not nervous/anxious. Objective: There were no vitals taken for this visit. Physical Exam  Constitutional:       General: He is not in acute distress. Appearance: He is well-developed. HENT:      Head: Normocephalic. Right Ear: External ear normal.      Left Ear: External ear normal.   Eyes:      Conjunctiva/sclera: Conjunctivae normal.   Neck:      Vascular: No JVD. Trachea: No tracheal deviation. Cardiovascular:      Rate and Rhythm: Normal rate and regular rhythm. Heart sounds: Normal heart sounds. Pulmonary:      Effort: Pulmonary effort is normal. No respiratory distress. Breath sounds: Normal breath sounds. No wheezing or rales. Chest:      Chest wall: No tenderness. Abdominal:      General: Bowel sounds are normal. There is no distension. Palpations: Abdomen is soft. There is no mass. Tenderness: There is no abdominal tenderness. There is no guarding or rebound. Musculoskeletal:         General: No tenderness or deformity. Cervical back: Neck supple. Skin:     General: Skin is warm and dry. Coloration: Skin is not pale. Findings: No erythema or rash. Neurological:      Mental Status: He is alert and oriented to person, place, and time. Cranial Nerves: No cranial nerve deficit. Motor: No abnormal muscle tone.

## 2021-12-01 DIAGNOSIS — R79.9 ABNORMAL FINDING OF BLOOD CHEMISTRY, UNSPECIFIED: ICD-10-CM

## 2021-12-01 DIAGNOSIS — I10 ESSENTIAL HYPERTENSION: ICD-10-CM

## 2021-12-01 DIAGNOSIS — I25.10 CORONARY ARTERY DISEASE INVOLVING NATIVE HEART, UNSPECIFIED VESSEL OR LESION TYPE, UNSPECIFIED WHETHER ANGINA PRESENT: ICD-10-CM

## 2021-12-01 LAB
ALBUMIN SERPL-MCNC: 4.5 G/DL (ref 3.5–4.6)
ALP BLD-CCNC: 97 U/L (ref 35–104)
ALT SERPL-CCNC: 20 U/L (ref 0–41)
ANION GAP SERPL CALCULATED.3IONS-SCNC: 13 MEQ/L (ref 9–15)
AST SERPL-CCNC: 23 U/L (ref 0–40)
BILIRUB SERPL-MCNC: 0.8 MG/DL (ref 0.2–0.7)
BUN BLDV-MCNC: 19 MG/DL (ref 8–23)
CALCIUM SERPL-MCNC: 9.1 MG/DL (ref 8.5–9.9)
CHLORIDE BLD-SCNC: 104 MEQ/L (ref 95–107)
CHOLESTEROL, TOTAL: 135 MG/DL (ref 0–199)
CO2: 24 MEQ/L (ref 20–31)
CREAT SERPL-MCNC: 0.82 MG/DL (ref 0.7–1.2)
GFR AFRICAN AMERICAN: >60
GFR NON-AFRICAN AMERICAN: >60
GLOBULIN: 2.7 G/DL (ref 2.3–3.5)
GLUCOSE BLD-MCNC: 84 MG/DL (ref 70–99)
HBA1C MFR BLD: 5.6 % (ref 4.8–5.9)
HCT VFR BLD CALC: 45.7 % (ref 42–52)
HDLC SERPL-MCNC: 57 MG/DL (ref 40–59)
HEMOGLOBIN: 15.4 G/DL (ref 14–18)
LDL CHOLESTEROL CALCULATED: 61 MG/DL (ref 0–129)
MCH RBC QN AUTO: 33.1 PG (ref 27–31.3)
MCHC RBC AUTO-ENTMCNC: 33.7 % (ref 33–37)
MCV RBC AUTO: 98.2 FL (ref 80–100)
PDW BLD-RTO: 13.3 % (ref 11.5–14.5)
PLATELET # BLD: 204 K/UL (ref 130–400)
POTASSIUM SERPL-SCNC: 4 MEQ/L (ref 3.4–4.9)
RBC # BLD: 4.65 M/UL (ref 4.7–6.1)
SODIUM BLD-SCNC: 141 MEQ/L (ref 135–144)
TOTAL PROTEIN: 7.2 G/DL (ref 6.3–8)
TRIGL SERPL-MCNC: 83 MG/DL (ref 0–150)
WBC # BLD: 5.2 K/UL (ref 4.8–10.8)

## 2021-12-02 ASSESSMENT — LIFESTYLE VARIABLES
HOW OFTEN DURING THE LAST YEAR HAVE YOU BEEN UNABLE TO REMEMBER WHAT HAPPENED THE NIGHT BEFORE BECAUSE YOU HAD BEEN DRINKING: NEVER
HOW OFTEN DURING THE LAST YEAR HAVE YOU HAD A FEELING OF GUILT OR REMORSE AFTER DRINKING: 1
HOW MANY STANDARD DRINKS CONTAINING ALCOHOL DO YOU HAVE ON A TYPICAL DAY: 1
HOW OFTEN DO YOU HAVE A DRINK CONTAINING ALCOHOL: 4
HOW OFTEN DURING THE LAST YEAR HAVE YOU NEEDED AN ALCOHOLIC DRINK FIRST THING IN THE MORNING TO GET YOURSELF GOING AFTER A NIGHT OF HEAVY DRINKING: NEVER
HOW OFTEN DO YOU HAVE SIX OR MORE DRINKS ON ONE OCCASION: 1
HOW OFTEN DURING THE LAST YEAR HAVE YOU BEEN UNABLE TO REMEMBER WHAT HAPPENED THE NIGHT BEFORE BECAUSE YOU HAD BEEN DRINKING: 0
HOW OFTEN DURING THE LAST YEAR HAVE YOU HAD A FEELING OF GUILT OR REMORSE AFTER DRINKING: LESS THAN MONTHLY
HOW OFTEN DO YOU HAVE A DRINK CONTAINING ALCOHOL: FOUR OR MORE TIMES A WEEK
HAVE YOU OR SOMEONE ELSE BEEN INJURED AS A RESULT OF YOUR DRINKING: NO
HOW OFTEN DO YOU HAVE SIX OR MORE DRINKS ON ONE OCCASION: LESS THAN MONTHLY
HOW OFTEN DURING THE LAST YEAR HAVE YOU FAILED TO DO WHAT WAS NORMALLY EXPECTED FROM YOU BECAUSE OF DRINKING: 0
HOW OFTEN DURING THE LAST YEAR HAVE YOU NEEDED AN ALCOHOLIC DRINK FIRST THING IN THE MORNING TO GET YOURSELF GOING AFTER A NIGHT OF HEAVY DRINKING: 0
HOW MANY STANDARD DRINKS CONTAINING ALCOHOL DO YOU HAVE ON A TYPICAL DAY: THREE OR FOUR
HOW OFTEN DURING THE LAST YEAR HAVE YOU FAILED TO DO WHAT WAS NORMALLY EXPECTED FROM YOU BECAUSE OF DRINKING: NEVER
HOW OFTEN DURING THE LAST YEAR HAVE YOU FOUND THAT YOU WERE NOT ABLE TO STOP DRINKING ONCE YOU HAD STARTED: NEVER
AUDIT-C TOTAL SCORE: 6
AUDIT-C TOTAL SCORE: 0
HOW OFTEN DURING THE LAST YEAR HAVE YOU FOUND THAT YOU WERE NOT ABLE TO STOP DRINKING ONCE YOU HAD STARTED: 0
AUDIT TOTAL SCORE: 0
AUDIT TOTAL SCORE: 11
HAS A RELATIVE, FRIEND, DOCTOR, OR ANOTHER HEALTH PROFESSIONAL EXPRESSED CONCERN ABOUT YOUR DRINKING OR SUGGESTED YOU CUT DOWN: YES, DURING THE PAST YEAR
HAS A RELATIVE, FRIEND, DOCTOR, OR ANOTHER HEALTH PROFESSIONAL EXPRESSED CONCERN ABOUT YOUR DRINKING OR SUGGESTED YOU CUT DOWN: 4
HAVE YOU OR SOMEONE ELSE BEEN INJURED AS A RESULT OF YOUR DRINKING: 0

## 2021-12-02 ASSESSMENT — PATIENT HEALTH QUESTIONNAIRE - PHQ9
SUM OF ALL RESPONSES TO PHQ9 QUESTIONS 1 & 2: 0
SUM OF ALL RESPONSES TO PHQ QUESTIONS 1-9: 0
SUM OF ALL RESPONSES TO PHQ QUESTIONS 1-9: 0
2. FEELING DOWN, DEPRESSED OR HOPELESS: 0
SUM OF ALL RESPONSES TO PHQ QUESTIONS 1-9: 0
1. LITTLE INTEREST OR PLEASURE IN DOING THINGS: 0

## 2021-12-06 ENCOUNTER — VIRTUAL VISIT (OUTPATIENT)
Dept: FAMILY MEDICINE CLINIC | Age: 71
End: 2021-12-06
Payer: MEDICARE

## 2021-12-06 DIAGNOSIS — Z00.00 ROUTINE GENERAL MEDICAL EXAMINATION AT A HEALTH CARE FACILITY: Primary | ICD-10-CM

## 2021-12-06 PROCEDURE — 3017F COLORECTAL CA SCREEN DOC REV: CPT | Performed by: INTERNAL MEDICINE

## 2021-12-06 PROCEDURE — 1123F ACP DISCUSS/DSCN MKR DOCD: CPT | Performed by: INTERNAL MEDICINE

## 2021-12-06 PROCEDURE — G0439 PPPS, SUBSEQ VISIT: HCPCS | Performed by: INTERNAL MEDICINE

## 2021-12-06 PROCEDURE — G8484 FLU IMMUNIZE NO ADMIN: HCPCS | Performed by: INTERNAL MEDICINE

## 2021-12-06 PROCEDURE — 4040F PNEUMOC VAC/ADMIN/RCVD: CPT | Performed by: INTERNAL MEDICINE

## 2021-12-06 ASSESSMENT — PATIENT HEALTH QUESTIONNAIRE - PHQ9
SUM OF ALL RESPONSES TO PHQ QUESTIONS 1-9: 0
2. FEELING DOWN, DEPRESSED OR HOPELESS: 0
SUM OF ALL RESPONSES TO PHQ9 QUESTIONS 1 & 2: 0
1. LITTLE INTEREST OR PLEASURE IN DOING THINGS: 0

## 2021-12-06 NOTE — PROGRESS NOTES
Medicare Annual Wellness Visit  Name: Bairon George Date: 2021   MRN: [id number] Sex: Male   Age: 73 y.o. Ethnicity: Non- / Non    : 1950 Race: White (non-)      Daniel Meeks is here for Erica Doyle AWV (Telephone Medicare Awv)    Screenings for behavioral, psychosocial and functional/safety risks, and cognitive dysfunction are all negative except as indicated below. These results, as well as other patient data from the [address] form, are documented in Flowsheets linked to this Encounter. Allergies   Allergen Reactions    Ciprofloxacin Other (See Comments)     Muscle cramp, tightness    Penicillins     Sulfamethoxazole-Trimethoprim      Muscle cramps and body aches       Prior to Visit Medications    Medication Sig Taking? Authorizing Provider   atorvastatin (LIPITOR) 80 MG tablet Take 1/2 tablet by mouth once daily.  Yes Historical Provider, MD   lisinopril (PRINIVIL;ZESTRIL) 2.5 MG tablet TAKE 1 TABLET BY MOUTH ONCE DAILY Yes Historical Provider, MD   metoprolol succinate (TOPROL XL) 25 MG extended release tablet Take 1 tablet by mouth daily Yes Cleveland Hammond MD   prasugrel (EFFIENT) 10 MG TABS Take 1 tablet by mouth daily Yes Cleveland Hammond MD   nitroGLYCERIN (NITROSTAT) 0.4 MG SL tablet Place 1 tablet under the tongue every 5 minutes as needed for Chest pain Yes Cleveland Hammond MD   glucosamine-chondroitin 500-400 MG CAPS Take by mouth daily Yes Historical Provider, MD   aspirin (ASPIR-LOW) 81 MG EC tablet Take 1 tablet by mouth daily Yes Cleveland Hammond MD       Past Medical History:   Diagnosis Date    CAD (coronary artery disease)     1 stent 14    Colon polyps 2016    need repeat in 3 years, Dr Bakari Cornell Diverticular disease 2016    Heart attack ([facility] [address] 2014    Hyperlipidemia     meds since MI     Hypertension     meds since MI     Prostate cancer ([facility] [address]     stage 1    Smoker      / quit smoking after MI       Past Surgical History: Procedure Laterality Date    COLONOSCOPY  05/13/16    Clearance Wilfrid MAYO    CORONARY ANGIOPLASTY WITH STENT PLACEMENT  2014    TN SONO GUIDE NEEDLE BIOPSY N/A 5/23/2018    TRUS BX performed by Kaya Cheng MD at [address] [place] [address] History   Problem Relation Age of Onset   Marques Hedger Cancer Mother         basal cell of face    Other Mother         Raynauds    COPD Father     Prostate Cancer Father     Stroke Sister     High Blood Pressure Sister     High Cholesterol Sister     High Blood Pressure Brother     Cancer Brother         throat cancer    Prostate Cancer Brother     Diabetes Brother     Prostate Cancer Paternal Grandfather        CareTeam (Including outside providers/suppliers regularly involved in providing care):   Patient Care Team:  Will Ramon MD as PCP - General (Internal Medicine)  Will Ramon MD as PCP [facility]    Wt Readings from Last 3 Encounters:   11/16/21 181 lb (82.1 kg)   11/19/20 180 lb 6.4 oz (81.8 kg)   09/16/20 177 lb 8 oz (80.5 kg)     There were no vitals filed for this visit. There is no height or weight on file to calculate BMI. Based upon direct observation of the patient, evaluation of cognition reveals recent and remote memory intact. Patient's complete Health Risk Assessment and screening values have been reviewed and are found in Flowsheets. The following problems were reviewed today and where indicated follow up appointments were made and/or referrals ordered.     Positive Risk Factor Screenings with Interventions:     Fall Risk:  2 or more falls in past year?: no  Fall with injury in past year?: (!) yes (tripped on uneven sidewalk while walking)  Fall Risk Interventions:    · Home safety tips provided       Substance History:  Social History     Tobacco History     Smoking Status  Former Smoker Quit date  9/21/2014 Smoking Frequency  2 packs/day for 40 years ([de-identified] pk yrs) Smoking Tobacco Type  Cigarettes    Smokeless Tobacco Use  Never Used    Tobacco Comment  smoked pipe           Alcohol History     Alcohol Use Status  Yes Comment  few glasses beer a day           Drug Use     Drug Use Status  No          Sexual Activity     Sexually Active  Not Currently Comment  been years               Alcohol Screening: Audit-C Score: 6  Total Score: 11    A score of 8 or more is associated with harmful or hazardous drinking. A score of 13 or more in women, and 15 or more in men, is likely to indicate alcohol dependence. Substance Abuse Interventions:  · Alcohol misuse/dependence:  educational materials provided    General Health and ACP:  General  In general, how would you say your health is?: Good  In the past 7 days, have you experienced any of the following?  New or Increased Pain, New or Increased Fatigue, Loneliness, Social Isolation, Stress or Anger?: None of These  Do you get the social and emotional support that you need?: Yes  Do you have a Living Will?: Yes  Advance Directives     Power of [employer] Will ACP-Advance Directive ACP-Power of     Not on File Not on File Not on File Not on File      General Health Risk Interventions:  · Stress: relaxation techniques discussed    Health Habits/Nutrition:  Health Habits/Nutrition  Do you exercise for at least 20 minutes 2-3 times per week?: Yes  Have you lost any weight without trying in the past 3 months?: No  Do you eat only one meal per day?: No  Have you seen the dentist within the past year?: (!) No     Health Habits/Nutrition Interventions:  · Dental exam overdue:  patient encouraged to make appointment with his/her dentist    Hearing/Vision:  No exam data present  Hearing/Vision  Do you or your family notice any trouble with your hearing that hasn't been managed with hearing aids?: (!) Yes (slight hearing loss)  Do you have difficulty driving, watching TV, or doing any of your daily activities because of your eyesight?: No  Have you had an eye exam within the past year?: (!) No  Hearing/Vision Interventions:  · Vision concerns:  patient encouraged to make appointment with his/her eye specialist    Safety:  Safety  Do you have working smoke detectors?: Yes  Have all throw rugs been removed or fastened?: (!) No  Do you have non-slip mats or surfaces in all bathtubs/showers?: Yes  Do all of your stairways have a railing or banister?: Yes  Are your doorways, halls and stairs free of clutter?: Yes  Do you always fasten your seatbelt when you are in a car?: Yes  Safety Interventions:  · Home safety tips provided     Personalized Preventive Plan   Current Health Maintenance Status  Immunization History   Administered Date(s) Administered    COVID-19, Merino Peter, PF, 30mcg/0.3mL 03/05/2021, 03/25/2021, 10/04/2021    DTaP 12/05/2014    Influenza Virus Vaccine 10/21/2014    Influenza, High Dose (Fluzone 65 yrs and older) 01/31/2020    Influenza, High-dose, Quadv, 65 yrs +, IM (Fluzone) 10/22/2020, 10/04/2021    Influenza, Quadv, IM, (6 mo and older Fluzone, Flulaval, Fluarix and 3 yrs and older Afluria) 01/01/2018    Influenza, Triv, 3 Years and older, IM (Afluria (5 yrs and older) 03/08/2017    Influenza, Triv, inactivated, subunit, adjuvanted, IM (Fluad 65 yrs and older) 01/29/2018    Pneumococcal Conjugate 13-valent ([id number]) 03/13/2018    Td (Adult), 5 Lf Tetanus Toxoid, Pf (Tenivac, Decavac) 12/05/2014        Health Maintenance   Topic Date Due    AAA screen  Never done    Shingles Vaccine (1 of 2) Never done    Low dose CT lung screening  Never done    DTaP/Tdap/Td vaccine (1 - Tdap) 12/06/2014    Pneumococcal 65+ years Vaccine (2 of 2 - PPSV23) 03/13/2019    Annual Wellness Visit (AWV)  Never done    COVID-19 Vaccine (4 - Booster for Pfizer series) 04/04/2022    PSA counseling  10/19/2022    Lipid screen  12/01/2022    Potassium monitoring  12/01/2022    Creatinine monitoring  12/01/2022    Colon cancer screen colonoscopy  05/13/2026    Flu vaccine  Completed    Hepatitis C screen  Completed    Hepatitis A vaccine  Aged Out    Hepatitis B vaccine  Aged Out    Hib vaccine  Aged Out    Meningococcal (ACWY) vaccine  Aged Out     Recommendations for [employer] Due: see orders and patient instructions/AVS.  . AVS mailed to pt. Recommended screening schedule for the next 5-10 years is provided to the patient in written form: see Patient Instructions/AVS.    Benito MERRITT LPN, [id number]/6/[id number], performed the documented evaluation under the direct supervision of the attending physician. This encounter was performed under Julieth davila MDs, direct supervision, 12/6/2021.

## 2021-12-06 NOTE — PATIENT INSTRUCTIONS
Personalized Preventive Plan for Daniel Meeks - 12/6/2021  Medicare offers a range of preventive health benefits. Some of the tests and screenings are paid in full while other may be subject to a deductible, co-insurance, and/or copay. Some of these benefits include a comprehensive review of your medical history including lifestyle, illnesses that may run in your family, and various assessments and screenings as appropriate. After reviewing your medical record and screening and assessments performed today your provider may have ordered immunizations, labs, imaging, and/or referrals for you. A list of these orders (if applicable) as well as your Preventive Care list are included within your After Visit Summary for your review. Other Preventive Recommendations:    · A preventive eye exam performed by an eye specialist is recommended every 1-2 years to screen for glaucoma; cataracts, macular degeneration, and other eye disorders. · A preventive dental visit is recommended every 6 months. · Try to get at least 150 minutes of exercise per week or 10,000 steps per day on a pedometer . · Order or download the FREE \"Exercise & Physical Activity: Your Everyday Guide\" from [employer]. Call 5-538.923.2173 or search [employer] online. · You need 3099-3813 mg of calcium and 6586-7632 IU of vitamin D per day. It is possible to meet your calcium requirement with diet alone, but a vitamin D supplement is usually necessary to meet this goal.  · When exposed to the sun, use a sunscreen that protects against both UVA and UVB radiation with an SPF of 30 or greater. Reapply every 2 to 3 hours or after sweating, drying off with a towel, or swimming. · Always wear a seat belt when traveling in a car. Always wear a helmet when riding a bicycle or motorcycle. Heart-Healthy Diet   Sodium, Fat, and Cholesterol Controlled Diet       What Is a Heart Healthy Diet?    A heart-healthy diet is one that limits sodium , certain types of fat , and cholesterol . This type of diet is recommended for:   People with any form of cardiovascular disease (eg, coronary heart disease , peripheral vascular disease , previous heart attack , previous stroke )   People with risk factors for cardiovascular disease, such as high blood pressure , high cholesterol , or diabetes   Anyone who wants to lower their risk of developing cardiovascular disease   Sodium    Sodium is a mineral found in many foods. In general, most people consume much more sodium than they need. Diets high in sodium can increase blood pressure and lead to edema (water retention). On a heart-healthy diet, you should consume no more than 2,300 mg (milligrams) of sodium per dayabout the amount in one teaspoon of table salt. The foods highest in sodium include table salt (about 50% sodium), processed foods, convenience foods, and preserved foods. Cholesterol    Cholesterol is a fat-like, waxy substance in your blood. Our bodies make some cholesterol. It is also found in animal products, with the highest amounts in fatty meat, egg yolks, whole milk, cheese, shellfish, and organ meats. On a heart-healthy diet, you should limit your cholesterol intake to less than 200 mg per day. It is normal and important to have some cholesterol in your bloodstream. But too much cholesterol can cause plaque to build up within your arteries, which can eventually lead to a heart attack or stroke. The two types of cholesterol that are most commonly referred to are:   Low-density lipoprotein (LDL) cholesterol  Also known as bad cholesterol, this is the cholesterol that tends to build up along your arteries. Bad cholesterol levels are increased by eating fats that are saturated or hydrogenated. Optimal level of this cholesterol is less than 100. Over 130 starts to get risky for heart disease.    High-density lipoprotein (HDL) cholesterol  Also known as good cholesterol, this type of cholesterol actually carries cholesterol away from your arteries and may, therefore, help lower your risk of having a heart attack. You want this level to be high (ideally greater than 60). It is a risk to have a level less than 40. You can raise this good cholesterol by eating olive oil, canola oil, avocados, or nuts. Exercise raises this level, too. Fat    Fat is calorie dense and packs a lot of calories into a small amount of food. Even though fats should be limited due to their high calorie content, not all fats are bad. In fact, some fats are quite healthful. Fat can be broken down into four main types. The good-for-you fats are:   Monounsaturated fat  found in oils such as olive and canola, avocados, and nuts and natural nut butters; can decrease cholesterol levels, while keeping levels of HDL cholesterol high   Polyunsaturated fat  found in oils such as safflower, sunflower, soybean, corn, and sesame; can decrease total cholesterol and LDL cholesterol   Omega-3 fatty acids  particularly those found in fatty fish (such as salmon, trout, tuna, mackerel, herring, and sardines); can decrease risk of arrhythmias, decrease triglyceride levels, and slightly lower blood pressure   The fats that you want to limit are:   Saturated fat  found in animal products, many fast foods, and a few vegetables; increases total blood cholesterol, including LDL levels   Animal fats that are saturated include: butter, lard, whole-milk dairy products, meat fat, and poultry skin   Vegetable fats that are saturated include: hydrogenated shortening, palm oil, coconut oil, cocoa butter   Hydrogenated or trans fat  found in margarine and vegetable shortening, most shelf stable snack foods, and fried foods; increases LDL and decreases HDL     It is generally recommended that you limit your total fat for the day to less than 30% of your total calories.  If you follow an 1800-calorie heart healthy diet, for example, this would mean 60 grams of fat or less per day. Saturated fat and trans fat in your diet raises your blood cholesterol the most, much more than dietary cholesterol does. For this reason, on a heart-healthy diet, less than 7% of your calories should come from saturated fat and ideally 0% from trans fat. On an 1800-calorie diet, this translates into less than 14 grams of saturated fat per day, leaving 46 grams of fat to come from mono- and polyunsaturated fats.    Food Choices on a Heart Healthy Diet   Food Category   Foods Recommended   Foods to Avoid   Grains   Breads and rolls without salted tops Most dry and cooked cereals Unsalted crackers and breadsticks Low-sodium or homemade breadcrumbs or stuffing All rice and pastas   Breads, rolls, and crackers with salted tops High-fat baked goods (eg, muffins, donuts, pastries) Quick breads, self-rising flour, and biscuit mixes Regular bread crumbs Instant hot cereals Commercially prepared rice, pasta, or stuffing mixes   Vegetables   Most fresh, frozen, and low-sodium canned vegetables Low-sodium and salt-free vegetable juices Canned vegetables if unsalted or rinsed   Regular canned vegetables and juices, including sauerkraut and pickled vegetables Frozen vegetables with sauces Commercially prepared potato and vegetable mixes   Fruits   Most fresh, frozen, and canned fruits All fruit juices   Fruits processed with salt or sodium   Milk   Nonfat or low-fat (1%) milk Nonfat or low-fat yogurt Cottage cheese, low-fat ricotta, cheeses labeled as low-fat and low-sodium   Whole milk Reduced-fat (2%) milk Malted and chocolate milk Full fat yogurt Most cheeses (unless low-fat and low salt) Buttermilk (no more than 1 cup per week)   Meats and Beans   Lean cuts of fresh or frozen beef, veal, lamb, or pork (look for the word loin) Fresh or frozen poultry without the skin Fresh or frozen fish and some shellfish Egg whites and egg substitutes (Limit whole eggs to three per week) Tofu Nuts or seeds (unsalted, dry-roasted), low-sodium peanut butter Dried peas, beans, and lentils   Any smoked, cured, salted, or canned meat, fish, or poultry (including da silva, chipped beef, cold cuts, hot dogs, sausages, sardines, and anchovies) Poultry skins Breaded and/or fried fish or meats Canned peas, beans, and lentils Salted nuts   Fats and Oils   Olive oil and canola oil Low-sodium, low-fat salad dressings and mayonnaise   Butter, margarine, coconut and palm oils, da silva fat   Snacks, Sweets, and Condiments   Low-sodium or unsalted versions of broths, soups, soy sauce, and condiments Pepper, herbs, and spices; vinegar, lemon, or lime juice Low-fat frozen desserts (yogurt, sherbet, fruit bars) Sugar, cocoa powder, honey, syrup, jam, and preserves Low-fat, trans-fat free cookies, cakes, and pies Hong and animal crackers, fig bars, chantel snaps   High-fat desserts Broth, soups, gravies, and sauces, made from instant mixes or other high-sodium ingredients Salted snack foods Canned olives Meat tenderizers, seasoning salt, and most flavored vinegars   Beverages   Low-sodium carbonated beverages Tea and coffee in moderation Soy milk   Commercially softened water   Suggestions   Make whole grains, fruits, and vegetables the base of your diet. Choose heart-healthy fats such as canola, olive, and flaxseed oil, and foods high in heart-healthy fats, such as nuts, seeds, soybeans, tofu, and fish. Eat fish at least twice per week; the fish highest in omega-3 fatty acids and lowest in mercury include salmon, herring, mackerel, sardines, and canned chunk light tuna. If you eat fish less than twice per week or have high triglycerides, talk to your doctor about taking fish oil supplements. Read food labels.    For products low in fat and cholesterol, look for fat free, low-fat, cholesterol free, saturated fat free, and trans fat freeAlso scan the Nutrition Facts Label, which lists saturated fat, trans fat, and cholesterol amounts. For products low in sodium, look for sodium free, very low sodium, low sodium, no added salt, and unsalted   Skip the salt when cooking or at the table; if food needs more flavor, get creative and try out different herbs and spices. Garlic and onion also add substantial flavor to foods. Trim any visible fat off meat and poultry before cooking, and drain the fat off after fontana. Use cooking methods that require little or no added fat, such as grilling, boiling, baking, poaching, broiling, roasting, steaming, stir-frying, and sauting. Avoid fast food and convenience food. They tend to be high in saturated and trans fat and have a lot of added salt. Talk to a registered dietitian for individualized diet advice. Last Reviewed: March 2011 César Forrest MS, MPH, RD   Updated: 3/29/2011   ·     Keep Your Memory Radha Mend       Many factors can affect your ability to remembera hectic lifestyle, aging, stress, chronic disease, and certain medicines. But, there are steps you can take to sharpen your mind and help preserve your memory. Challenge Your Brain   Regularly challenging your mind may help keeps it in top shape. Good mental exercises include:   Crossword puzzlesUse a dictionary if you need it; you will learn more that way. Brainteasers Try some! Crafts, such as wood working and sewing   Hobbies, such as gardening and building model airplanes   SocializingVisit old friends or join groups to meet new ones. Reading   Learning a new language   Taking a class, whether it be art history or talat chi   TravelingExperience the food, history, and culture of your destination   Learning to use a computer   Going to museums, the theater, or thought-provoking movies   Changing things in your daily life, such as reversing your pattern in the grocery store or brushing your teeth using your nondominant hand   Use Memory Aids   There is no need to remember every detail on your own.  These memory aids can help:   Calendars and day planners   Electronic organizers to store all sorts of helpful informationThese devices can \"beep\" to remind you of appointments. A book of days to record birthdays, anniversaries, and other occasions that occur on the same date every year   Detailed \"to-do\" lists and strategically placed sticky notes   Quick \"study\" sessionsBefore a gathering, review who will be there so their names will be fresh in your mind. Establish routinesFor example, keep your keys, wallet, and umbrella in the same place all the time or take medicine with your 8:00 AM glass of juice   Live a Healthy Life   Many actions that will keep your body strong will do the same for your mind. For example:   Talk to Your Doctor About Herbs and Supplements    Malnutrition and vitamin deficiencies can impair your mental function. For example, vitamin B12 deficiency can cause a range of symptoms, including confusion. But, what if your nutritional needs are being met? Can herbs and supplements still offer a benefit? Researchers have investigated a range of natural remedies, such as ginkgo , ginseng , and the supplement phosphatidylserine (PS). So far, though, the evidence is inconsistent as to whether these products can improve memory or thinking. If you are interested in taking herbs and supplements, talk to your doctor first because they may interact with other medicines that you are taking. Exercise Regularly    Among the many benefits of regular exercise are increased blood flow to the brain and decreased risk of certain diseases that can interfere with memory function. One study found that even moderate exercise has a beneficial effect. Examples of \"moderate\" exercise include:   Playing 18 holes of golf once a week, without a cart   Playing tennis twice a week   Walking one mile per day   Manage Stress    It can be tough to remember what is important when your mind is cluttered. Make time for relaxation.  Choose activities that calm you down, and make it routine. Manage Chronic Conditions    Side effects of high blood pressure , diabetes, and heart disease can interfere with mental function. Many of the lifestyle steps discussed here can help manage these conditions. Strive to eat a healthy diet, exercise regularly, get stress under control, and follow your doctor's advice for your condition. Minimize Medications    Talk to your doctor about the medicines that you take. Some may be unnecessary. Also, healthy lifestyle habits may lower the need for certain drugs. Last Reviewed: April 2010 Pablo Valdivia MD   Updated: 4/13/2010   ·     [facility] [facility] [address] [facility]       As we get older, changes in balance, gait, strength, vision, hearing, and cognition make even the most youthful senior more prone to accidents. Falls are one of the leading health risks for older people. This increased risk of falling is related to:   Aging process (eg, decreased muscle strength, slowed reflexes)   Higher incidence of chronic health problems (eg, arthritis, diabetes) that may limit mobility, agility or sensory awareness   Side effects of medicine (eg, dizziness, blurred vision)especially medicines like prescription pain medicines and drugs used to treat mental health conditions   Depending on the brittleness of your bones, the consequences of a fall can be serious and long lasting. Home Life   Research by the Association of Aging [facility]) shows that some home accidents among older adults can be prevented by making simple lifestyle changes and basic modifications and repairs to the home environment. Here are some lifestyle changes that experts recommend:   Have your hearing and vision checked regularly. Be sure to wear prescription glasses that are right for you. Speak to your doctor or pharmacist about the possible side effects of your medicines. A number of medicines can cause dizziness.    If you have problems with sleep, talk to your particularly slippery. Stairs should always have handrails and be carpeted or fitted with a non-skid tread. Is your telephone easily reachable. Is the cord safely tucked away? Room by Room   According to the Association of Aging, bathrooms and jose martin are the two most potentially hazardous rooms in your home. In the Kitchen    Be sure your stove is in proper working order and always make sure burners and the oven are off before you go out or go to sleep. Keep pots on the back burners, turn handles away from the front of the stove, and keep stove clean and free of grease build-up. Kitchen ventilation systems and range exhausts should be working properly. Keep flammable objects such as towels and pot holders away from the cooking area except when in use. Make sure kitchen curtains are tied back. Move cords and appliances away from the sink and hot surfaces. If extension cords are needed, install wiring guides so they do not hang over the sink, range, or working areas. Look for coffee pots, kettles and toaster ovens with automatic shut-offs. Keep a mop handy in the kitchen so you can wipe up spills instantly. You should also have a small fire extinguisher. Arrange your kitchen with frequently used items on lower shelves to avoid the need to stand on a stepstool to reach them. Make sure countertops are well-lit to avoid injuries while cutting and preparing food. In the Bathroom    Use a non-slip mat or decals in the tub and shower, since wet, soapy tile or porcelain surfaces are extremely slippery. Make sure bathroom rugs are non-skid or tape them firmly to the floor. Bathtubs should have at least one, preferably two, grab bars, firmly attached to structural supports in the wall. (Do not use built-in soap holders or glass shower doors as grab bars.)    Tub seats fitted with non-slip material on the legs allow you to wash sitting down.  For people with limited mobility, bathtub transfer benches allow you to slide safely into the tub. Raised toilet seats and toilet safety rails are helpful for those with knee or hip problems. In the [facility]    Make sure you use a nightlight and that the area around your bed is clear of potential obstacles. Be careful with electric blankets and never go to sleep with a heating pad, which can cause serious burns even if on a low setting. Use fire-resistant mattress covers and pillows, and NEVER smoke in bed. Keep a phone next to the bed that is programmed to dial 911 at the push of a button. If you have a chronic condition, you may want to sign on with an automatic call-in service. Typically the system includes a small pendant that connects directly to an emergency medical voice-response system. You should also make arrangements to stay in contact with someonefriend, neighbor, family memberon a regular schedule. Fire Prevention   According to the [employer]. (Smoke Alarms for Every) [address] [place] [address], senior citizens are one of the two highest risk groups for death and serious injuries due to residential fires. When cooking, wear short-sleeved items, never a bulky long-sleeved robe. The [facility]'s Safety Checklist for Older Consumers emphasizes the importance of checking basements, garages, workshops and storage areas for fire hazards, such as volatile liquids, piles of old rags or clothing and overloaded circuits. Never smoke in bed or when lying down on a couch or recliner chair. Small portable electric or kerosene heaters are responsible for many home fires and should be used cautiously if at all. If you do use one, be sure to keep them away from flammable materials. In case of fire, make sure you have a pre-established emergency exit plan. Have a professional check your fireplace and other fuel-burning appliances yearly.     Helping Hands   Baby boomers entering the ayers years will continue to see the development of new products to help older adults live safely and independently in spite of age-related changes. Making Life More Livable  , by [employer], lists over 1,000 products for \"living well in the mature years,\" such as bathing and mobility aids, household security devices, ergonomically designed knives and peelers, and faucet valves and knobs for temperature control. Medical supply stores and organizations are good sources of information about products that improve your quality of life and insure your safety.      Last Reviewed: November 2009 Lorna Hicks MD   Updated: 3/7/2011     ·

## 2022-02-15 ENCOUNTER — OFFICE VISIT (OUTPATIENT)
Dept: FAMILY MEDICINE CLINIC | Age: 72
End: 2022-02-15
Payer: MEDICARE

## 2022-02-15 VITALS
HEIGHT: 68 IN | OXYGEN SATURATION: 98 % | DIASTOLIC BLOOD PRESSURE: 76 MMHG | WEIGHT: 185 LBS | SYSTOLIC BLOOD PRESSURE: 130 MMHG | RESPIRATION RATE: 14 BRPM | HEART RATE: 64 BPM | BODY MASS INDEX: 28.04 KG/M2

## 2022-02-15 DIAGNOSIS — I25.10 CORONARY ARTERY DISEASE INVOLVING NATIVE HEART, UNSPECIFIED VESSEL OR LESION TYPE, UNSPECIFIED WHETHER ANGINA PRESENT: Primary | ICD-10-CM

## 2022-02-15 DIAGNOSIS — Z87.891 PERSONAL HISTORY OF TOBACCO USE: ICD-10-CM

## 2022-02-15 DIAGNOSIS — C61 PROSTATE CANCER (HCC): ICD-10-CM

## 2022-02-15 DIAGNOSIS — E78.2 MODERATE MIXED HYPERLIPIDEMIA NOT REQUIRING STATIN THERAPY: ICD-10-CM

## 2022-02-15 DIAGNOSIS — Z12.2 SCREENING FOR LUNG CANCER: ICD-10-CM

## 2022-02-15 PROCEDURE — G8417 CALC BMI ABV UP PARAM F/U: HCPCS | Performed by: INTERNAL MEDICINE

## 2022-02-15 PROCEDURE — 1036F TOBACCO NON-USER: CPT | Performed by: INTERNAL MEDICINE

## 2022-02-15 PROCEDURE — G0296 VISIT TO DETERM LDCT ELIG: HCPCS | Performed by: INTERNAL MEDICINE

## 2022-02-15 PROCEDURE — 1123F ACP DISCUSS/DSCN MKR DOCD: CPT | Performed by: INTERNAL MEDICINE

## 2022-02-15 PROCEDURE — G8484 FLU IMMUNIZE NO ADMIN: HCPCS | Performed by: INTERNAL MEDICINE

## 2022-02-15 PROCEDURE — 99214 OFFICE O/P EST MOD 30 MIN: CPT | Performed by: INTERNAL MEDICINE

## 2022-02-15 PROCEDURE — 4040F PNEUMOC VAC/ADMIN/RCVD: CPT | Performed by: INTERNAL MEDICINE

## 2022-02-15 PROCEDURE — G8427 DOCREV CUR MEDS BY ELIG CLIN: HCPCS | Performed by: INTERNAL MEDICINE

## 2022-02-15 PROCEDURE — 3017F COLORECTAL CA SCREEN DOC REV: CPT | Performed by: INTERNAL MEDICINE

## 2022-02-15 ASSESSMENT — ENCOUNTER SYMPTOMS
TROUBLE SWALLOWING: 0
SORE THROAT: 0
RECTAL PAIN: 0
BLOOD IN STOOL: 0
RHINORRHEA: 0
SINUS PRESSURE: 0
COLOR CHANGE: 0
SINUS PAIN: 0
EYE ITCHING: 0
VOMITING: 0
APNEA: 0
CONSTIPATION: 0
VOICE CHANGE: 0
NAUSEA: 0
ABDOMINAL DISTENTION: 0
EYE REDNESS: 0
EYE DISCHARGE: 0
ABDOMINAL PAIN: 0
EYE PAIN: 0
PHOTOPHOBIA: 0
SHORTNESS OF BREATH: 0
DIARRHEA: 0
WHEEZING: 0
CHEST TIGHTNESS: 0
COUGH: 0
FACIAL SWELLING: 0
BACK PAIN: 0

## 2022-02-15 ASSESSMENT — PATIENT HEALTH QUESTIONNAIRE - PHQ9
4. FEELING TIRED OR HAVING LITTLE ENERGY: 0
SUM OF ALL RESPONSES TO PHQ QUESTIONS 1-9: 0
8. MOVING OR SPEAKING SO SLOWLY THAT OTHER PEOPLE COULD HAVE NOTICED. OR THE OPPOSITE, BEING SO FIGETY OR RESTLESS THAT YOU HAVE BEEN MOVING AROUND A LOT MORE THAN USUAL: 0
7. TROUBLE CONCENTRATING ON THINGS, SUCH AS READING THE NEWSPAPER OR WATCHING TELEVISION: 0
5. POOR APPETITE OR OVEREATING: 0
10. IF YOU CHECKED OFF ANY PROBLEMS, HOW DIFFICULT HAVE THESE PROBLEMS MADE IT FOR YOU TO DO YOUR WORK, TAKE CARE OF THINGS AT HOME, OR GET ALONG WITH OTHER PEOPLE: 0
2. FEELING DOWN, DEPRESSED OR HOPELESS: 0
SUM OF ALL RESPONSES TO PHQ QUESTIONS 1-9: 0
9. THOUGHTS THAT YOU WOULD BE BETTER OFF DEAD, OR OF HURTING YOURSELF: 0
3. TROUBLE FALLING OR STAYING ASLEEP: 0
SUM OF ALL RESPONSES TO PHQ QUESTIONS 1-9: 0
SUM OF ALL RESPONSES TO PHQ QUESTIONS 1-9: 0
6. FEELING BAD ABOUT YOURSELF - OR THAT YOU ARE A FAILURE OR HAVE LET YOURSELF OR YOUR FAMILY DOWN: 0
1. LITTLE INTEREST OR PLEASURE IN DOING THINGS: 0
SUM OF ALL RESPONSES TO PHQ9 QUESTIONS 1 & 2: 0

## 2022-02-15 NOTE — PROGRESS NOTES
Subjective:      Patient ID: Elan Vyas is a 73 y.o. male New patient, here for evaluation of the following chief complaint(s):  Chief Complaint   Patient presents with    Hypertension       Hypertension  Pertinent negatives include no chest pain, headaches, neck pain, palpitations or shortness of breath. Cheryl Bose was seen today for established new doctort:    Coronary artery disease involving native heart, unspecified vessel or lesion type, unspecified whether angina presentcompliant with aspirin 81 mg orally daily, Effient 10 mg orally daily and metoprolol 25 mg orally daily    Essential hypertensioncompliant with Zestril 2.5 mg orally daily    Prostate cancer ([facility] [address]): strong family history  Colon CA: Maternal Grandmother. Moderate mixed hyperlipidemia not requiring statin therapy       S/p brachy therapy    At present he denies polyuria,  Polydipsia, constitutional, sinus, visual, cardiopulmonary, urologic, gastrointestinal, immunologic/hematologic, musculoskeletal, neurologic,dermatologic, or psychiatric complaints. Review of Systems   Constitutional: Negative for chills, diaphoresis, fatigue and fever. HENT: Negative for congestion, dental problem, drooling, ear discharge, ear pain, facial swelling, hearing loss, mouth sores, nosebleeds, postnasal drip, rhinorrhea, sinus pressure, sinus pain, sneezing, sore throat, tinnitus, trouble swallowing and voice change. Eyes: Negative for photophobia, pain, discharge, redness, itching and visual disturbance. Respiratory: Negative for apnea, cough, chest tightness, shortness of breath and wheezing. Cardiovascular: Negative for chest pain, palpitations and leg swelling. Gastrointestinal: Negative for abdominal distention, abdominal pain, blood in stool, constipation, diarrhea, nausea, rectal pain and vomiting. Endocrine: Negative for cold intolerance, heat intolerance, polydipsia, polyphagia and polyuria.    Genitourinary: Negative for decreased urine volume, difficulty urinating, dysuria, flank pain, frequency, genital sores, hematuria and urgency. Musculoskeletal: Negative for arthralgias, back pain, gait problem, joint swelling, myalgias, neck pain and neck stiffness. Skin: Negative for color change, rash and wound. Allergic/Immunologic: Negative for environmental allergies and food allergies. Neurological: Negative for dizziness, tremors, seizures, syncope, facial asymmetry, speech difficulty, weakness, light-headedness, numbness and headaches. Hematological: Negative for adenopathy. Does not bruise/bleed easily. Psychiatric/Behavioral: Negative for agitation, confusion, decreased concentration, hallucinations, self-injury, sleep disturbance and suicidal ideas. The patient is not nervous/anxious. Objective:   /76   Pulse 64   Resp 14   Ht 5' 8\" (1.727 m)   Wt 185 lb (83.9 kg)   SpO2 98%   BMI 28.13 kg/m²     Physical Exam  Constitutional:       General: He is not in acute distress. Appearance: He is well-developed. HENT:      Head: Normocephalic. Right Ear: External ear normal.      Left Ear: External ear normal.   Eyes:      Conjunctiva/sclera: Conjunctivae normal.   Neck:      Vascular: No JVD. Trachea: No tracheal deviation. Cardiovascular:      Rate and Rhythm: Normal rate and regular rhythm. Heart sounds: Normal heart sounds. Pulmonary:      Effort: Pulmonary effort is normal. No respiratory distress. Breath sounds: Normal breath sounds. No wheezing or rales. Chest:      Chest wall: No tenderness. Abdominal:      General: Bowel sounds are normal. There is no distension. Palpations: Abdomen is soft. There is no mass. Tenderness: There is no abdominal tenderness. There is no guarding or rebound. Musculoskeletal:         General: No tenderness or deformity. Cervical back: Neck supple. Skin:     General: Skin is warm and dry. Coloration: Skin is not pale.       Findings: consent, to reduce the patient's risk of exposure to COVID-19 and provide necessary medical care. The patient (and/or legal guardian) has also been advised to contact this office for worsening conditions or problems, and seek emergency medical treatment and/or call 911 if deemed necessary. Services were provided through a video synchronous discussion virtually to substitute for in-person clinic visit. Type of encounter was __ Doxy x__ telephone ___Facetime    Patient was located at their home. Provider was located at their ___ home or        __x__ office. --Jael iMles MD on 2/15/2022 at 12:22 PM    An electronic signature was used to authenticate this note. On this date 02/15/22 I have spent 30 minutes reviewing previous notes, test results and face to face with the patient discussing the diagnosis and importance of compliance with the treatment plan. Jael Miles MD    Please note, this report has been partially produced using speech recognition software  and may cause  and /or contain errors related to that system including grammar, punctuation and spelling as well as words and phrases that may seem inappropriate. If there are questions or concerns please feel free to contact me to clarify. Low Dose CT (LDCT) Lung Screening criteria met:     Age 55-77(Medicare) or 50-80 (USPSTF)   Pack year smoking >30 (Medicare) or >20 (USPSTF)   Still smoking or less than 15 year since quit   No sign or symptoms of lung cancer   > 11 months since last LDCT     Risks and benefits of lung cancer screening with LDCT scans discussed:    Significance of positive screen - False-positive LDCT results often occur. 95% of all positive results do not lead to a diagnosis of cancer. Usually further imaging can resolve most false-positive results; however, some patients may require invasive procedures.     Over diagnosis risk - 10% to 12% of screen-detected lung cancer cases are over diagnosedthat is, the cancer would not have been detected in the patient's lifetime without the screening. Need for follow up screens annually to continue lung cancer screening effectiveness     Risks associated with radiation from annual LDCT- Radiation exposure is about the same as for a mammogram, which is about 1/3 of the annual background radiation exposure from everyday life. Starting screening at age 54 is not likely to increase cancer risk from radiation exposure. Patients with comorbidities resulting in life expectancy of < 10 years, or that would preclude treatment of an abnormality identified on CT, should not be screened due to lack of benefit.     To obtain maximal benefit from this screening, smoking cessation and long-term abstinence from smoking is critical

## 2022-03-03 ENCOUNTER — TELEPHONE (OUTPATIENT)
Dept: CASE MANAGEMENT | Age: 72
End: 2022-03-03

## 2022-03-03 NOTE — TELEPHONE ENCOUNTER
Physician documentation on smoking history and CT Lung Screening reviewed. All required documentation complete. Patient is a former smoker (quit 2014) with a 80 pack year history ( 2 ppd x 40 years) per physician documentation.

## 2022-03-14 ENCOUNTER — HOSPITAL ENCOUNTER (OUTPATIENT)
Dept: CT IMAGING | Age: 72
Discharge: HOME | End: 2022-03-16
Payer: MEDICARE

## 2022-03-14 DIAGNOSIS — Z87.891 PERSONAL HISTORY OF TOBACCO USE: ICD-10-CM

## 2022-03-14 PROCEDURE — 71271 CT THORAX LUNG CANCER SCR C-: CPT

## 2022-04-20 DIAGNOSIS — C61 PROSTATE CANCER (HCC): ICD-10-CM

## 2022-04-20 LAB — PROSTATE SPECIFIC ANTIGEN: 1.5 NG/ML (ref 0–4)

## 2022-04-26 ENCOUNTER — OFFICE VISIT (OUTPATIENT)
Dept: UROLOGY | Age: 72
End: 2022-04-26
Payer: MEDICARE

## 2022-04-26 VITALS
SYSTOLIC BLOOD PRESSURE: 134 MMHG | HEART RATE: 68 BPM | HEIGHT: 68 IN | WEIGHT: 180 LBS | BODY MASS INDEX: 27.28 KG/M2 | DIASTOLIC BLOOD PRESSURE: 78 MMHG | OXYGEN SATURATION: 98 %

## 2022-04-26 DIAGNOSIS — C61 PROSTATE CANCER (HCC): Primary | ICD-10-CM

## 2022-04-26 PROCEDURE — 1036F TOBACCO NON-USER: CPT | Performed by: UROLOGY

## 2022-04-26 PROCEDURE — G8427 DOCREV CUR MEDS BY ELIG CLIN: HCPCS | Performed by: UROLOGY

## 2022-04-26 PROCEDURE — 99213 OFFICE O/P EST LOW 20 MIN: CPT | Performed by: UROLOGY

## 2022-04-26 PROCEDURE — 4040F PNEUMOC VAC/ADMIN/RCVD: CPT | Performed by: UROLOGY

## 2022-04-26 PROCEDURE — 1123F ACP DISCUSS/DSCN MKR DOCD: CPT | Performed by: UROLOGY

## 2022-04-26 PROCEDURE — 3017F COLORECTAL CA SCREEN DOC REV: CPT | Performed by: UROLOGY

## 2022-04-26 PROCEDURE — G8417 CALC BMI ABV UP PARAM F/U: HCPCS | Performed by: UROLOGY

## 2022-04-26 RX ORDER — DIMENHYDRINATE 50 MG
TABLET ORAL
COMMUNITY

## 2022-04-26 NOTE — PROGRESS NOTES
[facility] EVALUATION NOTE                                                 H&P                                                                                                                                                 Reason for Visit  Prostate cancer follow-up    History of Present Illness  42-year-old who underwent radioactive seed implant in   Patient was diagnosed with intermediate risk prostate cancer PSA of 11.17 Ayo 3+3 with no evidence of perineural invasion  Patient's PSA dropped down under 1 after radioactive seed implant  Currently up slightly at 1.5  No change in voiding pattern      Urologic Review of Systems/Symptoms  Unchanged    Review of Systems  Hospitalization: None recent  All 14 categories of Review of Systems otherwise reviewed no other findings reported.   No change in review of systems  Past Medical History:   Diagnosis Date    CAD (coronary artery disease)     1 stent 14    Colon polyps 2016    need repeat in 3 years, Dr Adi Walker Diverticular disease 2016    Heart attack ([facility] [address] 2014    Hyperlipidemia     meds since MI     Hypertension     meds since MI     Prostate cancer ([facility] [address]     stage 1    Smoker      / quit smoking after MI     Past Surgical History:   Procedure Laterality Date    COLONOSCOPY  16    Aris Dickson MD    CORONARY ANGIOPLASTY WITH STENT PLACEMENT      KS SONO GUIDE NEEDLE BIOPSY N/A 2018    TRUS BX performed by Isabelle Mazariegos MD at [address] [place] [address] History     Socioeconomic History    Marital status: Single     Spouse name: None    Number of children: None    Years of education: None    Highest education level: None   Occupational History    None   Tobacco Use    Smoking status: Former Smoker     Packs/day: 2.00     Years: 40.00     Pack years: 80.00     Types: Cigarettes     Quit date: 2014     Years since quittin.6    Smokeless tobacco: Never Used    Tobacco comment: smoked pipe Vaping Use    Vaping Use: Never used   Substance and Sexual Activity    Alcohol use: Yes     Comment: few glasses beer a day     Drug use: No    Sexual activity: Not Currently     Comment: been years   Other Topics Concern    None   Social History Narrative    None     Social Determinants of Health     Financial Resource Strain: Low Risk     Difficulty of Paying Living Expenses: Not hard at all   Food Insecurity: No Food Insecurity    Worried About Running Out of Food in the Last Year: Never true    Anna of Food in the Last Year: Never true   Transportation Needs:     Lack of Transportation (Medical): Not on file    Lack of Transportation (Non-Medical):  Not on file   Physical Activity:     Days of Exercise per Week: Not on file    Minutes of Exercise per Session: Not on file   Stress:     Feeling of Stress : Not on file   Social Connections:     Frequency of Communication with Friends and Family: Not on file    Frequency of Social Gatherings with Friends and Family: Not on file    Attends Yazidi Services: Not on file    Active Member of Clubs or Organizations: Not on file    Attends Club or Organization Meetings: Not on file    Marital Status: Not on file   Intimate Partner Violence:     Fear of Current or Ex-Partner: Not on file    Emotionally Abused: Not on file    Physically Abused: Not on file    Sexually Abused: Not on file   Housing Stability:     Unable to Pay for Housing in the Last Year: Not on file    Number of [place] in the Last Year: Not on file    Unstable Housing in the Last Year: Not on file     Family History   Problem Relation Age of Onset    Cancer Mother         basal cell of face    Other Mother         Raynauds    COPD Father     Prostate Cancer Father     Stroke Sister     High Blood Pressure Sister     High Cholesterol Sister     High Blood Pressure Brother     Cancer Brother         throat cancer    Prostate Cancer Brother     Diabetes Brother     Prostate Cancer Paternal Grandfather      Current Outpatient Medications   Medication Sig Dispense Refill    Coenzyme Q10 (CO Q-10) 100 MG CAPS Take by mouth      atorvastatin (LIPITOR) 80 MG tablet Take 1/2 tablet by mouth once daily.  lisinopril (PRINIVIL;ZESTRIL) 2.5 MG tablet 2.5 mg   5    metoprolol succinate (TOPROL XL) 25 MG extended release tablet Take 1 tablet by mouth daily 90 tablet 3    prasugrel (EFFIENT) 10 MG TABS Take 1 tablet by mouth daily 90 tablet 3    glucosamine-chondroitin 500-400 MG CAPS Take by mouth daily      aspirin (ASPIR-LOW) 81 MG EC tablet Take 1 tablet by mouth daily 30 tablet 11    nitroGLYCERIN (NITROSTAT) 0.4 MG SL tablet Place 1 tablet under the tongue every 5 minutes as needed for Chest pain (Patient not taking: Reported on 4/26/2022) 25 tablet 2     No current facility-administered medications for this visit. Ciprofloxacin, Penicillins, and Sulfamethoxazole-trimethoprim  All reviewed and verified by Dr Carlin Atkins on today's visit    PSA   Date Value Ref Range Status   04/20/2022 1.50 0.00 - 4.00 ng/mL Final   10/19/2021 0.98 0.00 - 4.00 ng/mL Final   04/21/2021 0.64 0.00 - 6.22 ng/mL Final   06/03/2020 11.17 (H) 0.00 - 5.40 ng/mL Final   11/22/2019 7.44 (H) 0.00 - 5.40 ng/mL Final     No results found for this visit on 04/26/22. Physical Exam  Vitals:    04/26/22 0958   BP: 134/78   Pulse: 68   SpO2: 98%   Weight: 180 lb (81.6 kg)   Height: 5' 8\" (1.727 m)     Constitutional: Not in distress. Cardiovascular: Normal rate, BP reviewed. Unremarkable  Pulmonary/Chest: Normal respiratory effort not short of breath  Abdominal: Not distended.    Urologic Exam  Normal rectal tone scarred prostate with no distinct nodule palpated  Assessment/Medical Necessity-Decision Making  Status post radioactive seed implant in 2020 with slight rise in PSA at 1.5  Prostate exam shows a scarred prostate no distinct nodule palpated  Plan  Patient will see Concetta Torres in 2 weeks for further evaluation and follow-up   At this point I recommend continued PSA checks every 6 months  Patient may need a consultation with oncology for further recommendations  PSA 6 months follow-up 6 months  Greater than 50% of 20 minutes spent consulting patient face-to-face  Orders Placed This Encounter   Procedures    PSA, Diagnostic     Standing Status:   Future     Standing Expiration Date:   4/26/2023     No orders of the defined types were placed in this encounter. Isabelle Mazariegos MD       Please note this report has been partially produced using speech recognition software  And may cause contain errors related to that system including grammar, punctuation and spelling as well as words and phrases that may seem inappropriate. If there are questions or concerns please feel free to contact me to clarify.

## 2022-05-17 ENCOUNTER — HOSPITAL ENCOUNTER (OUTPATIENT)
Dept: RADIATION ONCOLOGY | Age: 72
Discharge: HOME | End: 2022-05-17
Attending: RADIOLOGY
Payer: MEDICARE

## 2022-05-17 VITALS
BODY MASS INDEX: 27.49 KG/M2 | RESPIRATION RATE: 16 BRPM | HEART RATE: 62 BPM | DIASTOLIC BLOOD PRESSURE: 75 MMHG | TEMPERATURE: 97.8 F | WEIGHT: 180.8 LBS | SYSTOLIC BLOOD PRESSURE: 139 MMHG | OXYGEN SATURATION: 98 %

## 2022-05-17 DIAGNOSIS — C61 PROSTATE CANCER (HCC): Primary | ICD-10-CM

## 2022-05-17 PROCEDURE — 99212 OFFICE O/P EST SF 10 MIN: CPT | Performed by: RADIOLOGY

## 2022-05-17 PROCEDURE — 99214 OFFICE O/P EST MOD 30 MIN: CPT | Performed by: RADIOLOGY

## 2022-05-17 NOTE — PROGRESS NOTES
NURSING ASSESSMENT     Date: 5/17/2022        Patient Name: Link Rucker     YOB: 1950      Age:  81 y.o. MRN: [id number]       Chaperone [] Yes   [x] No      Advance Directives:   Do you currently have completed advance directives (living will)? [x] Yes   [] No         *If yes, please bring us a copy for your records. *If no, would you like info or assistance in completing advance directives (living will)? [] Yes   [x] No    Pain Score:   Pain Score (1-10): none    General: No Problems  Patient has gained weight [] Yes   [x] No  Patient has lost weight [] Yes   [x] No  How much weight in pounds and over what length of time:     Eyes (Ophthalmic): No Problem     Skin (Dermatological): No Problems     ENT: No Problems     Respiratory: No Problems     Cardiovascular: No Problems      Device   [] Yes   [x] No   Copy of Card Obtained [] Yes   [x] No    Gastrointestinal: No Problems    Genito-Urinary: IPSS score 13, frequency, urgency, weak stream     Breast: No Problems     Musculoskeletal: Back Pain or more stiffness on occasion over the past 10-20 years    Neurological: No Problems      Hematological and Lymphatic: Easy Bruising     Endocrine: No Problems     A 10-point review of systems  has been conducted and pertinent positives have been   recorded.  All other review of systems are negative    Was the patient admitted during the course of treatment OR within 30 days of treatment? n/a    Additional Comments: will see Dr Stefany Argueta again in October [address] [facility]  [] Metastatic Prostate Cancer  [] High/Very high risk prostate cancer  [] Ayo Score of 7 & of the following:   [] 1 Family member with metastatic prostate cancer   [] 1 Family member with ovarian cancer   [] 1 Family member with pancreatic cancer   [] 1 Familly member with breast cancer 48 or younger   [] 2 Family members on the same side of the family with breast or prostate cancer at any age   [] Parisa Freitas  [] Physician notified of any positive answers  [x] Assessment negative

## 2022-06-27 NOTE — PROGRESS NOTES
[facility]           Radiation Oncology      [address] [facility]        [address]        [facility] [phone]        F: [phone]       [email or web address]                   Dr. Peter Smith MD PhD    FOLLOW-UP NOTE     Date of Service: 2022  Patient ID: Brian Olivares YOB: 1950  MRN: [id number]   Acct Number: [de-identified]       NAME:  Brian WICK:  1950 81 y.o. male     PCP: Lazara Kate MD    REFERRING PROVIDER: Kassi Adam    DIAGNOSIS:  1. Prostate cancer ([facility] [address]        STAGING: Cancer Staging  Prostate cancer [facility])  Staging form: Prostate, AJCC 8th Edition  - Clinical stage from 2019: Stage I (cT2a, cN0, cM0, PSA: 7.4, Grade Group: 1) - Signed by Thalia Hollis MD on 2019      DIAGNOSIS:  [id number] favorable intermediate risk prostate cancer, PSA 11.17, Ayo 3+3=6 without PNI, 2/12 cores+, 28.5cc at biopsy.  PSA romaine under watchful waiting.      STAGING: Cancer Staging  Prostate cancer [facility])  Staging form: Prostate, AJCC 8th Edition  - Clinical stage from 2019: Stage I (cT2a, cN0, cM0, PSA: 7.4, Grade Group: 1) - Signed by Thalia Hollis MD on 2019        PRIOR TREATMENT: Transperineal ultrasound guided prostate brachytherapy, Iodine 125, 145Gy, implanted 65 seeds using 20 needles.  Implant date 20. SpaceOAR gel placed 20.     TIME SINCE TREATMENT:  21 months     RECENT HISTORY: Evaristo Alves returns for follow up status post completion of definitive radiation therapy with iodine 125 low-dose rate intraprostatic brachytherapy permanent implant. He does report some urinary symptoms including incomplete emptying, frequency, intermittency, urgency, weakened stream, and straining. He has nocturia 1-2 times nightly. His AUA score is 13 today. His PSA today was 1.50 which represents a persistent increase from his prior interval PSA on 10/19/2021 which was 0.98 and 2021 which is 0.64.     Past medical, surgical, social and family histories reviewed and updated as indicated. ALLERGIES:  Ciprofloxacin, Penicillins, and Sulfamethoxazole-trimethoprim       MEDICATIONS:   Current Outpatient Medications:     Coenzyme Q10 (CO Q-10) 100 MG CAPS, Take by mouth, Disp: , Rfl:     atorvastatin (LIPITOR) 80 MG tablet, Take 1/2 tablet by mouth once daily. , Disp: , Rfl:     lisinopril (PRINIVIL;ZESTRIL) 2.5 MG tablet, 2.5 mg , Disp: , Rfl: 5    metoprolol succinate (TOPROL XL) 25 MG extended release tablet, Take 1 tablet by mouth daily, Disp: 90 tablet, Rfl: 3    prasugrel (EFFIENT) 10 MG TABS, Take 1 tablet by mouth daily, Disp: 90 tablet, Rfl: 3    nitroGLYCERIN (NITROSTAT) 0.4 MG SL tablet, Place 1 tablet under the tongue every 5 minutes as needed for Chest pain (Patient not taking: Reported on 4/26/2022), Disp: 25 tablet, Rfl: 2    glucosamine-chondroitin 500-400 MG CAPS, Take by mouth daily, Disp: , Rfl:     aspirin (ASPIR-LOW) 81 MG EC tablet, Take 1 tablet by mouth daily, Disp: 30 tablet, Rfl: 11    Review of Systems   All other systems reviewed and are negative. PHYSICAL EXAMINATION:      Vitals:    05/17/22 1314   BP: 139/75   Pulse: 62   Resp: 16   Temp: 97.8 °F (36.6 °C)   SpO2: 98%   Weight: 180 lb 12.8 oz (82 kg)       Wt Readings from Last 3 Encounters:   05/17/22 180 lb 12.8 oz (82 kg)   04/26/22 180 lb (81.6 kg)   02/15/22 185 lb (83.9 kg)       ECOG PERFORMANCE STATUS:  1    Physical Exam  Vitals and nursing note reviewed. Constitutional:       Appearance: Normal appearance. Comments: He is present unaccompanied   HENT:      Head: Normocephalic and atraumatic. Eyes:      General: No scleral icterus. Extraocular Movements: Extraocular movements intact. Cardiovascular:      Heart sounds: Normal heart sounds. Pulmonary:      Breath sounds: Normal breath sounds. Abdominal:      Palpations: Abdomen is soft.    Genitourinary:     Comments: Digital rectal examination revealed a relatively smooth prostate with a small nodule palpable in the right gland. There was no blood in the examination finger. Musculoskeletal:         General: No swelling. Normal range of motion. Cervical back: Normal range of motion and neck supple. Right lower leg: No edema. Left lower leg: No edema. Skin:     General: Skin is warm and dry. Neurological:      General: No focal deficit present. Mental Status: He is alert and oriented to person, place, and time. Psychiatric:         Mood and Affect: Mood normal.         Behavior: Behavior normal.     ASSESSMENT/PLAN:  This is a 82-year-old gentleman with a history of  [id number] favorable intermediate risk prostate cancer, PSA 11.17, [place] 3+3=6 without PNI, 2/12 cores+, 28.5cc at biopsy.  PSA romaine under watchful waiting.  He underwent a iodine-125 permanent prostate seed implant in August 2020 and returns for routine follow-up approximately 15 months after completing radiation therapy. He denies any urinary symptoms at this time and also denies any bowel changes. He does have some chronic low back pain that is recently flared when he was exerting himself and notes that this has been an issue for some time. PSA showed a slight persistent rise to 1.50, up from prior value of 0.98.     I again discussed with the patient that there is a possibility for a PSA bounce generally within 1 to 2 years after completing radiation therapy. Since he still remains in this timeframe, I will repeat a PSA in 6 months and have him return to clinic for a formal follow-up 6 months. He understands that if he has an additional rise in PSA that would fulfill the [facility] definition of jaswinder +2 ng/mL, then this would be highly concerning for biochemical failure and we would investigate with restaging scans at that juncture. The patient is not interested in starting Flomax for his urinary symptoms but will let us know if he changes his mind. He will be seeing Dr. Scarlet Macdonald on 10/27/2022. ORDERS: Repeat PSA    FOLLOW-UP: Routine follow-up on 11/15/2022.     Leatha Murguia MD PhD  Radiation Oncologist, [facility]

## 2022-07-19 ENCOUNTER — TELEPHONE (OUTPATIENT)
Dept: FAMILY MEDICINE CLINIC | Age: 72
End: 2022-07-19

## 2022-07-19 NOTE — TELEPHONE ENCOUNTER
Attempted to contact the patient to reschedule the appointment on 7/18/22 due to a power outage - no answer - lmov for patient to call back to schedule

## 2022-10-20 DIAGNOSIS — C61 PROSTATE CANCER (HCC): ICD-10-CM

## 2022-10-20 LAB — PROSTATE SPECIFIC ANTIGEN: 2.75 NG/ML (ref 0–4)

## 2022-10-26 ENCOUNTER — OFFICE VISIT (OUTPATIENT)
Dept: FAMILY MEDICINE CLINIC | Age: 72
End: 2022-10-26
Payer: MEDICARE

## 2022-10-26 VITALS
DIASTOLIC BLOOD PRESSURE: 80 MMHG | RESPIRATION RATE: 14 BRPM | BODY MASS INDEX: 28.19 KG/M2 | HEIGHT: 68 IN | SYSTOLIC BLOOD PRESSURE: 128 MMHG | WEIGHT: 186 LBS | OXYGEN SATURATION: 96 % | HEART RATE: 87 BPM

## 2022-10-26 DIAGNOSIS — E78.2 MODERATE MIXED HYPERLIPIDEMIA NOT REQUIRING STATIN THERAPY: ICD-10-CM

## 2022-10-26 DIAGNOSIS — Z12.11 SCREEN FOR COLON CANCER: ICD-10-CM

## 2022-10-26 DIAGNOSIS — I10 ESSENTIAL HYPERTENSION: ICD-10-CM

## 2022-10-26 DIAGNOSIS — I25.10 CORONARY ARTERY DISEASE INVOLVING NATIVE HEART, UNSPECIFIED VESSEL OR LESION TYPE, UNSPECIFIED WHETHER ANGINA PRESENT: Primary | ICD-10-CM

## 2022-10-26 PROCEDURE — G8417 CALC BMI ABV UP PARAM F/U: HCPCS | Performed by: INTERNAL MEDICINE

## 2022-10-26 PROCEDURE — G8484 FLU IMMUNIZE NO ADMIN: HCPCS | Performed by: INTERNAL MEDICINE

## 2022-10-26 PROCEDURE — 1036F TOBACCO NON-USER: CPT | Performed by: INTERNAL MEDICINE

## 2022-10-26 PROCEDURE — G0009 ADMIN PNEUMOCOCCAL VACCINE: HCPCS | Performed by: INTERNAL MEDICINE

## 2022-10-26 PROCEDURE — 90677 PCV20 VACCINE IM: CPT | Performed by: INTERNAL MEDICINE

## 2022-10-26 PROCEDURE — 3078F DIAST BP <80 MM HG: CPT | Performed by: INTERNAL MEDICINE

## 2022-10-26 PROCEDURE — G8427 DOCREV CUR MEDS BY ELIG CLIN: HCPCS | Performed by: INTERNAL MEDICINE

## 2022-10-26 PROCEDURE — 3017F COLORECTAL CA SCREEN DOC REV: CPT | Performed by: INTERNAL MEDICINE

## 2022-10-26 PROCEDURE — 3074F SYST BP LT 130 MM HG: CPT | Performed by: INTERNAL MEDICINE

## 2022-10-26 PROCEDURE — 99214 OFFICE O/P EST MOD 30 MIN: CPT | Performed by: INTERNAL MEDICINE

## 2022-10-26 PROCEDURE — 1123F ACP DISCUSS/DSCN MKR DOCD: CPT | Performed by: INTERNAL MEDICINE

## 2022-10-26 ASSESSMENT — ENCOUNTER SYMPTOMS
ABDOMINAL PAIN: 0
BACK PAIN: 0
SINUS PRESSURE: 0
COUGH: 0
WHEEZING: 0
BLOOD IN STOOL: 0
SORE THROAT: 0
EYE REDNESS: 0
NAUSEA: 0
EYE ITCHING: 0
FACIAL SWELLING: 0
SINUS PAIN: 0
RHINORRHEA: 0
VOICE CHANGE: 0
CHEST TIGHTNESS: 0
EYE PAIN: 0
CONSTIPATION: 0
RECTAL PAIN: 0
DIARRHEA: 0
COLOR CHANGE: 0
ABDOMINAL DISTENTION: 0
SHORTNESS OF BREATH: 0
PHOTOPHOBIA: 0
TROUBLE SWALLOWING: 0
VOMITING: 0
APNEA: 0
EYE DISCHARGE: 0

## 2022-10-26 NOTE — PROGRESS NOTES
Subjective:      Patient ID: Lisa Patino is a 81 y.o. male New patient, here for evaluation of the following chief complaint(s):  Chief Complaint   Patient presents with    Follow-up         Hypertension  Pertinent negatives include no chest pain, headaches, neck pain, palpitations or shortness of breath. Coronary artery disease involving native heart, unspecified vessel or lesion type, unspecified whether angina present-compliant with aspirin 81 mg orally daily, Effient 10 mg orally daily and metoprolol 25 mg orally daily. Essential hypertension-compliant with Zestril 2.5 mg orally daily        Prostate cancer [facility]): strong family history  Colon CA: Maternal Grandmother. Moderate mixed hyperlipidemia not requiring statin therapy       S/p brachy therapy      Health maintenance the patient is due for pneumococcal vaccination at this time. At present he denies polyuria,  Polydipsia, constitutional, sinus, visual, cardiopulmonary, urologic, gastrointestinal, immunologic/hematologic, musculoskeletal, neurologic,dermatologic, or psychiatric complaints. Review of Systems   Constitutional:  Negative for chills, diaphoresis, fatigue and fever. HENT:  Negative for congestion, dental problem, drooling, ear discharge, ear pain, facial swelling, hearing loss, mouth sores, nosebleeds, postnasal drip, rhinorrhea, sinus pressure, sinus pain, sneezing, sore throat, tinnitus, trouble swallowing and voice change. Eyes:  Negative for photophobia, pain, discharge, redness, itching and visual disturbance. Respiratory:  Negative for apnea, cough, chest tightness, shortness of breath and wheezing. Cardiovascular:  Negative for chest pain, palpitations and leg swelling. Gastrointestinal:  Negative for abdominal distention, abdominal pain, blood in stool, constipation, diarrhea, nausea, rectal pain and vomiting.    Endocrine: Negative for cold intolerance, heat intolerance, polydipsia, polyphagia and polyuria. Genitourinary:  Negative for decreased urine volume, difficulty urinating, dysuria, flank pain, frequency, genital sores, hematuria and urgency. Musculoskeletal:  Negative for arthralgias, back pain, gait problem, joint swelling, myalgias, neck pain and neck stiffness. Skin:  Negative for color change, rash and wound. Allergic/Immunologic: Negative for environmental allergies and food allergies. Neurological:  Negative for dizziness, tremors, seizures, syncope, facial asymmetry, speech difficulty, weakness, light-headedness, numbness and headaches. Hematological:  Negative for adenopathy. Does not bruise/bleed easily. Psychiatric/Behavioral:  Negative for agitation, confusion, decreased concentration, hallucinations, self-injury, sleep disturbance and suicidal ideas. The patient is not nervous/anxious. Objective:   /80   Pulse 87   Resp 14   Ht 5' 8\" (1.727 m)   Wt 186 lb (84.4 kg)   SpO2 96%   BMI 28.28 kg/m²     Physical Exam  Constitutional:       General: He is not in acute distress. Appearance: He is well-developed. HENT:      Head: Normocephalic. Right Ear: External ear normal.      Left Ear: External ear normal.   Eyes:      Conjunctiva/sclera: Conjunctivae normal.   Neck:      Vascular: No JVD. Trachea: No tracheal deviation. Cardiovascular:      Rate and Rhythm: Normal rate and regular rhythm. Heart sounds: Normal heart sounds. Pulmonary:      Effort: Pulmonary effort is normal. No respiratory distress. Breath sounds: Normal breath sounds. No wheezing or rales. Chest:      Chest wall: No tenderness. Abdominal:      General: Bowel sounds are normal. There is no distension. Palpations: Abdomen is soft. There is no mass. Tenderness: There is no abdominal tenderness. There is no guarding or rebound. Musculoskeletal:         General: No tenderness or deformity. Cervical back: Neck supple.    Skin:     General: Skin is warm and dry. Coloration: Skin is not pale. Findings: No erythema or rash. Neurological:      Mental Status: He is alert and oriented to person, place, and time. Cranial Nerves: No cranial nerve deficit. Motor: No abnormal muscle tone. Psychiatric:         Thought Content: Thought content normal.         Judgment: Judgment normal.       Assessment:       Diagnosis Orders   1. Coronary artery disease involving native heart, unspecified vessel or lesion type, unspecified whether angina present        2. Moderate mixed hyperlipidemia not requiring statin therapy        3. Essential hypertension        4. Screen for colon cancer  Bettie Rai MD, Gastroenterology, [facility]            Plan:          Coronary artery disease involving native heart, unspecified vessel or lesion type, unspecified whether angina present    -Continue aspirin, Effient and metoprolol    Essential hypertension-continue Zestril 2.5 mg orally daily    Prostate cancer (HCC)-follow-up with urology    Moderate mixed hyperlipidemia-continue Lipitor    Maintenance we will refer the patient to gastroenterology for colorectal cancer screening and administer pneumococcal vaccination    No follow-ups on file. HM : due for colonoscpy    --Kalina Fisher MD on 10/26/2022 at 1:45 PM    An electronic signature was used to authenticate this note. On this date 10/26/22 I have spent 30 minutes reviewing previous notes, test results and face to face with the patient discussing the diagnosis and importance of compliance with the treatment plan.      Kalina Fisher MD

## 2022-10-27 ENCOUNTER — OFFICE VISIT (OUTPATIENT)
Dept: UROLOGY | Age: 72
End: 2022-10-27
Payer: MEDICARE

## 2022-10-27 VITALS
OXYGEN SATURATION: 97 % | SYSTOLIC BLOOD PRESSURE: 120 MMHG | HEART RATE: 71 BPM | HEIGHT: 68 IN | BODY MASS INDEX: 27.28 KG/M2 | DIASTOLIC BLOOD PRESSURE: 66 MMHG | WEIGHT: 180 LBS

## 2022-10-27 DIAGNOSIS — R97.21 RISING PSA FOLLOWING TREATMENT FOR MALIGNANT NEOPLASM OF PROSTATE: Primary | ICD-10-CM

## 2022-10-27 PROCEDURE — 3017F COLORECTAL CA SCREEN DOC REV: CPT | Performed by: UROLOGY

## 2022-10-27 PROCEDURE — 1036F TOBACCO NON-USER: CPT | Performed by: UROLOGY

## 2022-10-27 PROCEDURE — G8484 FLU IMMUNIZE NO ADMIN: HCPCS | Performed by: UROLOGY

## 2022-10-27 PROCEDURE — 99213 OFFICE O/P EST LOW 20 MIN: CPT | Performed by: UROLOGY

## 2022-10-27 PROCEDURE — G8427 DOCREV CUR MEDS BY ELIG CLIN: HCPCS | Performed by: UROLOGY

## 2022-10-27 PROCEDURE — G8417 CALC BMI ABV UP PARAM F/U: HCPCS | Performed by: UROLOGY

## 2022-10-27 PROCEDURE — 1123F ACP DISCUSS/DSCN MKR DOCD: CPT | Performed by: UROLOGY

## 2022-10-27 NOTE — PROGRESS NOTES
[facility] EVALUATION NOTE                                                 H&P          Note:  Assessment and plan  Rising PSA after brachytherapy  68-year-old male who underwent brachytherapy for low-grade low-volume prostate cancer  Patient's PSA jaswinder was around 0.6  Current PSA greater than 3  Patient will have a consultation with Dr. Daniela Gorman at the cancer center  We will coordinate our plan as to whether or not patient needs another biopsy versus observation versus going with external beam radiation therapy without a biopsy      The note below is complete evaluation of patient on follow-up/consultation                                                                                                                                                 Reason for Visit  Rising PSA    History of Present Illness  Rising PSA after brachytherapy in 2020   Currently having no issues with voiding      Urologic Review of Systems/Symptoms  No voiding issues    Review of Systems  Hospitalization: None recent  All 14 categories of Review of Systems otherwise reviewed no other findings reported.   No change in review of systems  Past Medical History:   Diagnosis Date    CAD (coronary artery disease)     1 stent 9-22-14    Colon polyps 2016    need repeat in 3 years, Dr Natha Shone    Diverticular disease 2016    Heart attack ([address] 09/22/2014    Hyperlipidemia     meds since MI 2014    Hypertension     meds since MI 2014    Prostate cancer ([address]     stage 1    Smoker     2014 / quit smoking after MI     Past Surgical History:   Procedure Laterality Date    COLONOSCOPY  05/13/16    Chau Oakes MD    CORONARY ANGIOPLASTY WITH STENT PLACEMENT  2014    [place] SONO GUIDE NEEDLE BIOPSY N/A 5/23/2018    TRUS BX performed by Ruchi Madrigal MD at [address] [place] [address] History     Socioeconomic History    Marital status: Single     Spouse name: None    Number of children: None    Years of education: None    Highest education level: None Tobacco Use    Smoking status: Former     Packs/day: 2.00     Years: 40.00     Pack years: 80.00     Types: Cigarettes     Quit date: 2014     Years since quittin.1    Smokeless tobacco: Never    Tobacco comments:     smoked pipe    Vaping Use    Vaping Use: Never used   Substance and Sexual Activity    Alcohol use: Yes     Comment: few glasses beer a day     Drug use: No    Sexual activity: Not Currently     Comment: been years     Social Determinants of Health     Financial Resource Strain: Low Risk     Difficulty of Paying Living Expenses: Not hard at all   Food Insecurity: No Food Insecurity    Worried About Running Out of Food in the Last Year: Never true    Ran Out of Food in the Last Year: Never true     Family History   Problem Relation Age of Onset    Cancer Mother         basal cell of face    Other Mother         Raynauds    COPD Father     Prostate Cancer Father     Stroke Sister     High Blood Pressure Sister     High Cholesterol Sister     High Blood Pressure Brother     Cancer Brother         throat cancer    Prostate Cancer Brother     Diabetes Brother     Prostate Cancer Paternal Grandfather      Current Outpatient Medications   Medication Sig Dispense Refill    Coenzyme Q10 (CO Q-10) 100 MG CAPS Take by mouth      atorvastatin (LIPITOR) 80 MG tablet Take 1/2 tablet by mouth once daily.       lisinopril (PRINIVIL;ZESTRIL) 2.5 MG tablet 2.5 mg   5    metoprolol succinate (TOPROL XL) 25 MG extended release tablet Take 1 tablet by mouth daily 90 tablet 3    prasugrel (EFFIENT) 10 MG TABS Take 1 tablet by mouth daily 90 tablet 3    glucosamine-chondroitin 500-400 MG CAPS Take by mouth daily      aspirin (ASPIR-LOW) 81 MG EC tablet Take 1 tablet by mouth daily 30 tablet 11    nitroGLYCERIN (NITROSTAT) 0.4 MG SL tablet Place 1 tablet under the tongue every 5 minutes as needed for Chest pain (Patient not taking: No sig reported) 25 tablet 2     No current facility-administered medications for this visit. Ciprofloxacin, Penicillins, and Sulfamethoxazole-trimethoprim  All reviewed and verified by Dr Pelon Rodriges on today's visit    PSA   Date Value Ref Range Status   10/20/2022 2.75 0.00 - 4.00 ng/mL Final   04/20/2022 1.50 0.00 - 4.00 ng/mL Final   10/19/2021 0.98 0.00 - 4.00 ng/mL Final   04/21/2021 0.64 0.00 - 6.22 ng/mL Final   06/03/2020 11.17 (H) 0.00 - 5.40 ng/mL Final     No results found for this visit on 10/27/22. Physical Exam  Vitals:    10/27/22 1140   BP: 120/66   Pulse: 71   SpO2: 97%   Weight: 180 lb (81.6 kg)   Height: 5' 8\" (1.727 m)     Constitutional: Not in distress. Urologic Exam  Unchanged  Additional findings  None  Remainder the physical exam is normal  Assessment/Medical Necessity-Decision Making  Rising PSA after brachytherapy  Plan  Follow-up after visit with Dr. Mitzi Adame at radiation oncology  Patient may opt for another biopsy versus observation  Greater than 50% of 20 minutes spent consulting patient face-to-face  No orders of the defined types were placed in this encounter. No orders of the defined types were placed in this encounter. Carina Beltran MD       Please note this report has been partially produced using speech recognition software  And may cause contain errors related to that system including grammar, punctuation and spelling as well as words and phrases that may seem inappropriate. If there are questions or concerns please feel free to contact me to clarify.

## 2022-11-15 ENCOUNTER — HOSPITAL ENCOUNTER (OUTPATIENT)
Dept: RADIATION ONCOLOGY | Age: 72
Discharge: HOME | End: 2022-11-15
Attending: RADIOLOGY
Payer: MEDICARE

## 2022-11-15 VITALS
WEIGHT: 186.6 LBS | TEMPERATURE: 97.1 F | RESPIRATION RATE: 18 BRPM | BODY MASS INDEX: 28.37 KG/M2 | HEART RATE: 70 BPM | OXYGEN SATURATION: 98 % | DIASTOLIC BLOOD PRESSURE: 98 MMHG | SYSTOLIC BLOOD PRESSURE: 165 MMHG

## 2022-11-15 DIAGNOSIS — R97.20 ELEVATED PSA: ICD-10-CM

## 2022-11-15 DIAGNOSIS — C61 PROSTATE CANCER (HCC): Primary | ICD-10-CM

## 2022-11-15 DIAGNOSIS — R30.0 DYSURIA: ICD-10-CM

## 2022-11-15 PROCEDURE — 99212 OFFICE O/P EST SF 10 MIN: CPT | Performed by: RADIOLOGY

## 2022-11-15 PROCEDURE — 99214 OFFICE O/P EST MOD 30 MIN: CPT | Performed by: RADIOLOGY

## 2022-11-15 NOTE — PROGRESS NOTES
NURSING ASSESSMENT     Date: 11/15/2022        Patient Name: Autumn Colunga     YOB: 1950      Age:  81 y.o. MRN: [id number]       Chaperone [] Yes   [x] No      Advance Directives:   Do you currently have completed advance directives (living will)? [x] Yes   [] No         *If yes, please bring us a copy for your records. *If no, would you like info or assistance in completing advance directives (living will)? [] Yes   [x] No    Pain Score:   Pain Score (1-10): none      Is pain affecting your ability to take care of yourself or move throughout your home? [] Yes   [x] No    General: No Problems  Patient has gained weight [x] Yes   [] No  Patient has lost weight [] Yes   [x] No  How much weight in pounds and over what length of time: up 6 lb in 6 months    Eyes (Ophthalmic): No Problem     Skin (Dermatological): No Problems     ENT: No Problems     Respiratory: No Problems     Cardiovascular: No Problems      Device   [] Yes   [x] No   Copy of Card Obtained [] Yes   [x] No    Gastrointestinal: Constipation at times, has hemorrhoids with occasional blood on the stool and tissue about every 2-3 weeks    Genito-Urinary: IPSS 5, occasional burning with urination     Breast: No Problems     Musculoskeletal: No Problems    Neurological: No Problems      Hematological and Lymphatic: Easy Bruising     Endocrine: No Problems     A 10-point review of systems  has been conducted and pertinent positives have been   recorded. All other review of systems are negative    Was the patient admitted during the course of treatment OR within 30 days of treatment?  no    Additional Comments: PSA 10/20/22 was 2.75

## 2022-11-29 ENCOUNTER — FOLLOWUP TELEPHONE ENCOUNTER (OUTPATIENT)
Dept: RADIATION ONCOLOGY | Age: 72
End: 2022-11-29

## 2022-11-29 NOTE — PROGRESS NOTES
VANESSA telephoned pt as he had left voice message requesting information about Prostate Cancer Support. VANESSA provided him with telephone numbers and web site for the [employer] and support programs as well as the H.O.P.E program as he had expressed his wish to speak with fellow cancer patients and survivors. VANESSA also informed that Man to Man support group may resume at the [facility], for which he would also be welcomed to attend and notified once that occurs. Additionally, pt had some questions about Medicare Part D. As such, he was provided with telephone number for the [facility] as well as contact information for Shane, the Options Counselor at the [facility] and [address] [place] [address]. Pt expressed appreciation for each and was invited to contact VANESSA should he have any further questions or concerns.

## 2022-11-30 ENCOUNTER — TELEPHONE (OUTPATIENT)
Dept: UROLOGY | Age: 72
End: 2022-11-30

## 2022-11-30 ASSESSMENT — ENCOUNTER SYMPTOMS
RESPIRATORY NEGATIVE: 1
NAUSEA: 0
VOMITING: 0
BLOOD IN STOOL: 1
EYES NEGATIVE: 1
ALLERGIC/IMMUNOLOGIC NEGATIVE: 1

## 2022-11-30 NOTE — TELEPHONE ENCOUNTER
Pt called to ask if you have spoken to Dr. Ambrose Jimenez. He stated that his appointment was 11-15-22.

## 2022-11-30 NOTE — PROGRESS NOTES
[facility]           Radiation Oncology      [address] [facility]        [address]        [place]: [phone]        F: [phone]       [employer]                   Dr. Emiliano Jacob MD PhD    FOLLOW-UP NOTE     Date of Service: 11/15/2022  Patient ID: Katiuska WICK: 1950  MRN: [id number]   Acct Number: [de-identified]       NAME:  Katiuska WICK:  1950 81 y.o. male     PCP: Mayela Clark MD    REFERRING PROVIDER: Caitlyn Lindsey    DIAGNOSIS:  1. Prostate cancer ([address]        STAGING: Cancer Staging  Prostate cancer [facility])  Staging form: Prostate, AJCC 8th Edition  - Clinical stage from 2019: Stage I (cT2a, cN0, cM0, PSA: 7.4, Grade Group: 1) - Signed by Jc Ireland MD on 2019      PRIOR TREATMENT: Transperineal ultrasound guided prostate brachytherapy, Iodine 125, 145Gy, implanted 65 seeds using 20 needles. Implant date 20. SpaceOAR gel placed 20. TIME SINCE TREATMENT:  27 months     RECENT HISTORY: Ted Lo returns for follow up status post completion of definitive radiation therapy with iodine 125 low-dose rate intraprostatic brachytherapy permanent implant. He does report some urinary symptoms including occasional dysuria a few times per week but otherwise reports that his urinary symptoms have been stable. His IPSS score today is 5. He had a repeat PSA done on 10/20/22 which was 2.75, representing a persistent rise over the past several readings. Previous value on 22 was 1.50, on 10/19/2021 it was 0.98 and on 2021 it was 0.64. He previously saw Dr. Caitlyn Lindsey on 10/27/2022 with these elevated values but no work-up has been ordered at this time. Past medical, surgical, social and family histories reviewed and updated as indicated.     ALLERGIES:  Ciprofloxacin, Penicillins, and Sulfamethoxazole-trimethoprim       MEDICATIONS:   Current Outpatient Medications:     Coenzyme Q10 (CO Q-10) 100 MG CAPS, Take by mouth, Disp: , Rfl:     atorvastatin (LIPITOR) 80 MG tablet, Take 1/2 tablet by mouth once daily. , Disp: , Rfl:     lisinopril (PRINIVIL;ZESTRIL) 2.5 MG tablet, 2.5 mg , Disp: , Rfl: 5    metoprolol succinate (TOPROL XL) 25 MG extended release tablet, Take 1 tablet by mouth daily, Disp: 90 tablet, Rfl: 3    prasugrel (EFFIENT) 10 MG TABS, Take 1 tablet by mouth daily, Disp: 90 tablet, Rfl: 3    nitroGLYCERIN (NITROSTAT) 0.4 MG SL tablet, Place 1 tablet under the tongue every 5 minutes as needed for Chest pain (Patient not taking: No sig reported), Disp: 25 tablet, Rfl: 2    glucosamine-chondroitin 500-400 MG CAPS, Take by mouth daily, Disp: , Rfl:     aspirin (ASPIR-LOW) 81 MG EC tablet, Take 1 tablet by mouth daily, Disp: 30 tablet, Rfl: 11    Review of Systems   Constitutional:  Negative for activity change, fatigue and fever. HENT: Negative. Eyes: Negative. Respiratory: Negative. Cardiovascular: Negative. Gastrointestinal:  Positive for blood in stool. Negative for nausea and vomiting. Endocrine: Negative. Genitourinary:  Positive for dysuria. Negative for decreased urine volume, difficulty urinating, hematuria and urgency. Musculoskeletal: Negative. Allergic/Immunologic: Negative. Neurological: Negative. Hematological: Negative. Psychiatric/Behavioral: Negative. PHYSICAL EXAMINATION:      Vitals:    11/15/22 1405   BP: (!) 165/98   Pulse: 70   Resp: 18   Temp: 97.1 °F (36.2 °C)   SpO2: 98%   Weight: 186 lb 9.6 oz (84.6 kg)       Wt Readings from Last 3 Encounters:   11/15/22 186 lb 9.6 oz (84.6 kg)   10/27/22 180 lb (81.6 kg)   10/26/22 186 lb (84.4 kg)       ECOG PERFORMANCE STATUS:  1    Physical Exam  Vitals and nursing note reviewed. Constitutional:       Appearance: Normal appearance. Comments: He is present unaccompanied   HENT:      Head: Normocephalic and atraumatic. Eyes:      General: No scleral icterus. Extraocular Movements: Extraocular movements intact. Cardiovascular:      Heart sounds: Normal heart sounds. Pulmonary:      Breath sounds: Normal breath sounds. Abdominal:      Palpations: Abdomen is soft. Musculoskeletal:         General: No swelling. Normal range of motion. Cervical back: Normal range of motion and neck supple. Right lower leg: No edema. Left lower leg: No edema. Skin:     General: Skin is warm and dry. Neurological:      General: No focal deficit present. Mental Status: He is alert and oriented to person, place, and time. Psychiatric:         Mood and Affect: Mood normal.         Behavior: Behavior normal.     ASSESSMENT/PLAN:  This is a 74-year-old gentleman with a history of  [id number] favorable intermediate risk prostate cancer, PSA 11.17, Ayo 3+3=6 without PNI, 2/12 cores+, 28.5cc at biopsy. PSA romaine under watchful waiting. He underwent a iodine-125 permanent prostate seed implant in August 2020 and returns for routine follow-up approximately 27 months after completing radiation therapy. He denies any new urinary symptoms at this time but has been reporting some blood in his stool, perhaps due to hemorrhoids. His PSA from last month has shown a persistent rise and I am therefore concerned about biochemical recurrence at this juncture as he has demonstrated an increase in PSA from jaswinder over 2 ng/ml and also has had more then 3 consecutive rises. I discussed getting repeat imaging with bone scan and PSMA PET scan in order to evaluate for metastatic disease. I will also place referral to Dr. Jason Hercules at [facility] for MR-guided fusion biopsy to be performed to establish diagnosis of metastatic disease. Once these studies are completed I will have him return to discuss next steps. The patient is amenable to this plan. A combined total of 40 minutes was spent in preparing this encounter as well as in meeting with the patient in person.   Please excuse any typos in this consultation note as voice dictation was used.    ORDERS:  Urinalysis and Culture; PSMA-PET scan, bone scan, and referral to Dr. Mary Robles with [facility] urology to discuss MR-guided fusion biopsy    FOLLOW-UP: After restaging scans and biopsy has been completed    Niurka Meeks MD PhD  Radiation Oncologist, [address] [place] [address]

## 2022-11-30 NOTE — TELEPHONE ENCOUNTER
Helio Hutchison called back and stated that the notes have not been completed and Dr. Dagmar Greene is out of the office this week. I explained that you would be out and she is sending another email to Dr. Dagmar Greene letting him know that this note needs to be completed today.

## 2022-12-01 DIAGNOSIS — R30.0 DYSURIA: ICD-10-CM

## 2022-12-01 DIAGNOSIS — C61 PROSTATE CANCER (HCC): ICD-10-CM

## 2022-12-01 LAB
BILIRUBIN URINE: NEGATIVE
BLOOD, URINE: NEGATIVE
CLARITY: CLEAR
COLOR: YELLOW
GLUCOSE URINE: NEGATIVE MG/DL
KETONES, URINE: NEGATIVE MG/DL
LEUKOCYTE ESTERASE, URINE: NEGATIVE
NITRITE, URINE: NEGATIVE
PH UA: 5 (ref 5–9)
PROTEIN UA: NEGATIVE MG/DL
SPECIFIC GRAVITY UA: 1.02 (ref 1–1.03)
UROBILINOGEN, URINE: 0.2 E.U./DL

## 2022-12-01 NOTE — TELEPHONE ENCOUNTER
Kathia Lino from radiology/oncology called   Dr. Abad Hamilton office not is completed and he is ordering    C&S  Bone Scan   PSMA Pet Scan   And a referral to Dr. Farhat Castillo

## 2022-12-02 LAB — URINE CULTURE, ROUTINE: NORMAL

## 2022-12-09 ENCOUNTER — HOSPITAL ENCOUNTER (OUTPATIENT)
Dept: NUCLEAR MEDICINE | Age: 72
Discharge: HOME | End: 2022-12-11

## 2022-12-09 DIAGNOSIS — C61 PROSTATE CANCER (HCC): ICD-10-CM

## 2022-12-09 DIAGNOSIS — R97.20 ELEVATED PSA: ICD-10-CM

## 2022-12-09 RX ORDER — TC 99M MEDRONATE 20 MG/10ML
25 INJECTION, POWDER, LYOPHILIZED, FOR SOLUTION INTRAVENOUS
Status: DISCONTINUED | OUTPATIENT
Start: 2022-12-09 | End: 2022-12-12 | Stop reason: HOSPADM

## 2022-12-13 ENCOUNTER — HOSPITAL ENCOUNTER (OUTPATIENT)
Dept: NUCLEAR MEDICINE | Age: 72
Discharge: HOME | End: 2022-12-15
Payer: MEDICARE

## 2022-12-13 ENCOUNTER — TELEPHONE (OUTPATIENT)
Dept: FAMILY MEDICINE CLINIC | Age: 72
End: 2022-12-13

## 2022-12-13 PROCEDURE — A9503 TC99M MEDRONATE: HCPCS | Performed by: RADIOLOGY

## 2022-12-13 PROCEDURE — 78306 BONE IMAGING WHOLE BODY: CPT | Performed by: RADIOLOGY

## 2022-12-13 PROCEDURE — 3430000000 HC RX DIAGNOSTIC RADIOPHARMACEUTICAL: Performed by: RADIOLOGY

## 2022-12-13 RX ORDER — TC 99M MEDRONATE 20 MG/10ML
25 INJECTION, POWDER, LYOPHILIZED, FOR SOLUTION INTRAVENOUS
Status: COMPLETED | OUTPATIENT
Start: 2022-12-13 | End: 2022-12-13

## 2022-12-13 RX ADMIN — TC 99M MEDRONATE 22.4 MILLICURIE: 20 INJECTION, POWDER, LYOPHILIZED, FOR SOLUTION INTRAVENOUS at 11:04

## 2023-01-03 ENCOUNTER — TELEPHONE (OUTPATIENT)
Dept: UROLOGY | Age: 73
End: 2023-01-03

## 2023-01-03 NOTE — TELEPHONE ENCOUNTER
Pt called to see if you had a chance to review his PET and Bone scan. He is also seeing Dr. Caryn Brown on 1-4-23.

## 2023-01-05 NOTE — TELEPHONE ENCOUNTER
OFFICE VISIT (UROLOGY) Observed: 01/04/2023 10:00 AM Status: UNK Source: [facility] REPOSITORY   Diagnoses/Problems  Assessed   Prostate cancer ([phone] ([phone])  Patient Discussion/Summary  We discussed moving forward with a repeat prostate biopsy. Patient was originally sent for MRI and fusion biopsy. My experience is that MRI is difficult to interpret after brachytherapy and would likely not add much more information than the prior PET scan. I think a standard biopsy with additional samples from the left posterior prostate adjacent to the seminal vesicle will be adequate. We discussed the procedure, expected recovery course, potential complications. Patient is agreeable to proceed     Chief Complaint  Prostate cancer     History of Present Illness81year-old gentleman presents for evaluation/second opinion regarding prostate cancer. Patient was initially diagnosed with Ayo 6 adenocarcinoma the prostate and was on active surveillance. He then had a rising PSA which prompted him to undergo brachytherapy in 2020. PSA slowly romaine after treatment and is now 2.75 in October. He had a PMSA which showed increased activity in the left prostate and possible seminal vesicle involvement. There was no suggestion of metastatic disease. He is considering additional radiation treatment and is here today to discuss repeat biopsy. Review of Systems  A 12 system review was completed and is negative with the exception of those signs and symptoms noted in the history of present illness. Allergies  Medication   No Known Drug Allergies  Recorded By: Laci Mireles; 1/4/2023 10:07:09 AM  Current Meds    Medication NameInstruction  Atorvastatin Calcium 80 MG Oral TabletTAKE 1/2 TABLET DAILY. Lisinopril 2.5 MG Oral TabletTAKE 1 TABLET DAILY.   Prasugrel HCl - 10 MG Oral Tablet  Vitals  Vital Signs   Recorded: [id number] 09:59AM  Ehjrjtqswme28.3 F  Heart NTAF24  [id number]  [id number]  Height5 ft 8 in  Itjueh708 lb   BMI Qgmdllftok72.04 kg/m2  BSA Calculated2  Tobacco Useb) No  Falls Screening (Age 18+)a) No falls within the last year  Physical Exam  General: in NAD, appears stated age  Head: normocephalic, atraumatic  Respiratory: normal effort, no use of accessory muscles  Cardiovascular: no edema noted  Skin: normal turgor, no rashes  Neurologic: grossly intact, oriented to person/place/time  Psychiatric: mode and affect appropriate     Results/Data  PET/CT Prostate [id number] 11:56AMNon Ambulatory, Provider  Ordering Provider: Brittny Philip [id number]  Test NameResultFlagReference  PET/CT Prostate PSMA(Report)  FINAL REPORT Interpreted by: Isidro Fritz MD and Mcwilliams MD 12/08/22 16:52 MRN: [id number] Patient Name: Corinne Cedillo: PET CT PROSTATE PSMA; 12/8/2022 11:56 am INDICATION: Malignant neoplasm of prostate. No EMR records. COMPARISON: None. ACCESSION NUMBER(S): [id number] ORDERING CLINICIAN: STEFANIA GERARD TECHNIQUE: DIVISION OF NUCLEAR MEDICINE POSITRON EMISSION TOMOGRAPHY (PET-CT) The patient received an intravenous dose of 9.6 mCi of Fluorine-18 PSMA (DCFPyL). Positron emission tomographic (PET) images from mid-thigh to skull vertex were then acquired after a delay of approximately 60 minutes. Also acquired was a contemporaneous noncontrast CT scan performed for attenuation correction of PET images and anatomic localization. The PET and CT images were digitally fused for display. All images were acquired on a combined PET-CT scanner unit. Some areas of FDG accumulation may be described in standardized uptake value (SUV) units. Subsequent Treatment Strategy (PS) CALIBRATION: Dose Injection-to-Scan Interval (mins): 63 min L3 vertebral body SUV: 1.3 FINDINGS: NECK AND CHEST: No abnormal focal F-18 PSMA uptake. Physiologic activity is seen within the bilateral parotid and submandibular glands. ABDOMEN: There is no abnormal F-18 PSMA uptake within abdominal lymph nodes.  Intensely increased F-18 PSMA uptake within the liver and the spleen, which is physiologic. Physiologic activity is noted within bilateral kidneys. Physiologic tracer excretion into the small bowel. PELVIS: Numerous radiopaque densities consistent with brachytherapy seeds are seen. Intensely focal increased F-18 PSMA uptake seen in the left superolateral aspect of the prostate gland with possible extension into the left seminal vesicles with an SUV of 9.6 No abnormal F-18 PSMA uptake is seen within periprostatic and pelvic lymph nodes. MUSCULOSKELETAL: There is a focus of increased F-18 PSMA activity seen within the left anterior 5th rib without low-dose CT correlate lesion with SUV max of 1.5. Additional focus of increased F-18 PSMA activity is seen within the left anterolateral scapula associated with a lytic lesion with well-circumscribed sclerotic borders with SUV max of 1.9. There is increased F-18 PSMA uptake involving the left acetabulum where there is a well-circumscribed lytic lesion likely degenerative with subchondral cyst formation with SUV max of 1.8. IMPRESSION: 1. Increased F-18 PSMA uptake within the left superolateral prostate gland with possible extension into the left seminal vesicles is suggestive of residual disease. 2. There is no evidence for F-18 PSMA-avid lymph halley metastasis. 3. Mild increased F-18 PSMA uptake is seen within the left anterior 5th rib at the costochondral junction without low-dose CT correlate is favored to be posttraumatic. 4. Mild increased F-18 PSMA uptake within the left anterolateral scapula associated with a well-circumscribed lytic lesion is nonspecific, felt to be benign, although metastatic osseous involvement can not be excluded. I personally reviewed the image(s) / study and I agree with the findings as stated. This study [facility] been [facility] [place] [place].  Electronically signed by: Nahum Campbell 12/08/22 16:52  Signatures  Electronically signed by : Opal Wesley MD; Jan 4 2023 10:42AM EST (Author)     RESULTS  PET CT PROSTATE PSMA Observed: 12/08/2022 11:56 AM Status: F Source: [address]   MRN: [id number]  Patient Name: Alba Bottom:  PET CT PROSTATE PSMA; 12/8/2022 11:56 am    INDICATION:  Malignant neoplasm of prostate. No EMR records. COMPARISON:  None. ACCESSION NUMBER(S):  [id number]    ORDERING CLINICIAN:  STEFANIA GERARD    TECHNIQUE:  DIVISION OF NUCLEAR MEDICINE  POSITRON EMISSION TOMOGRAPHY (PET-CT)    The patient received an intravenous dose of 9.6 mCi of Fluorine-18  PSMA (DCFPyL). Positron emission tomographic (PET) images from  mid-thigh to skull vertex were then acquired after a delay of  approximately 60 minutes. Also acquired was a contemporaneous  noncontrast CT scan performed for attenuation correction of PET  images and anatomic localization. The PET and CT images were  digitally fused for display. All images were acquired on a combined  PET-CT scanner unit. Some areas of FDG accumulation may be described  in standardized uptake value (SUV) units. Subsequent Treatment Strategy (PS)    CALIBRATION:  Dose Injection-to-Scan Interval (mins): 63 min  L3 vertebral body SUV: 1.3      FINDINGS:  NECK AND CHEST:  No abnormal focal F-18 PSMA uptake. Physiologic activity is seen within the bilateral parotid and  submandibular glands. ABDOMEN:  There is no abnormal F-18 PSMA uptake within abdominal lymph nodes. Intensely increased F-18 PSMA uptake within the liver and the spleen,  which is physiologic. Physiologic activity is noted within bilateral kidneys. Physiologic  tracer excretion into the small bowel. PELVIS:  Numerous radiopaque densities consistent with brachytherapy seeds are  seen.   Intensely focal increased F-18 PSMA uptake seen in the left  superolateral aspect of the prostate gland with possible extension  into the left seminal vesicles with an SUV of 9.6  No abnormal F-18 PSMA uptake is seen within periprostatic and pelvic  lymph nodes. MUSCULOSKELETAL:  There is a focus of increased F-18 PSMA activity seen within the left  anterior 5th rib without low-dose CT correlate lesion with SUV max of  1.5. Additional focus of increased F-18 PSMA activity is seen within the  left anterolateral scapula associated with a lytic lesion with  well-circumscribed sclerotic borders with SUV max of 1.9. There is increased F-18 PSMA uptake involving the left acetabulum  where there is a well-circumscribed lytic lesion likely degenerative  with subchondral cyst formation with SUV max of 1.8. IMPRESSION:  1. Increased F-18 PSMA uptake within the left superolateral prostate  gland with possible extension into the left seminal vesicles is  suggestive of residual disease. 2. There is no evidence for F-18 PSMA-avid lymph halley metastasis. 3. Mild increased F-18 PSMA uptake is seen within the left anterior  5th rib at the costochondral junction without low-dose CT correlate  is favored to be posttraumatic. 4. Mild increased F-18 PSMA uptake within the left anterolateral  scapula associated with a well-circumscribed lytic lesion is  nonspecific, felt to be benign, although metastatic osseous  involvement can not be excluded. I personally reviewed the image(s) / study and I agree with the  findings as stated. This study has been interpreted at [facility] in [place], [place].   Electronically signed by: Delfina Azar MD

## 2023-02-14 ENCOUNTER — HOSPITAL ENCOUNTER (OUTPATIENT)
Dept: RADIATION ONCOLOGY | Age: 73
Discharge: HOME | End: 2023-02-14
Attending: RADIOLOGY
Payer: MEDICARE

## 2023-02-14 VITALS
RESPIRATION RATE: 18 BRPM | WEIGHT: 184.4 LBS | TEMPERATURE: 97.5 F | SYSTOLIC BLOOD PRESSURE: 122 MMHG | OXYGEN SATURATION: 99 % | DIASTOLIC BLOOD PRESSURE: 79 MMHG | BODY MASS INDEX: 28.04 KG/M2 | HEART RATE: 64 BPM

## 2023-02-14 DIAGNOSIS — C61 PROSTATE CANCER (HCC): ICD-10-CM

## 2023-02-14 DIAGNOSIS — R97.20 ELEVATED PSA: Primary | ICD-10-CM

## 2023-02-14 PROCEDURE — 99214 OFFICE O/P EST MOD 30 MIN: CPT | Performed by: RADIOLOGY

## 2023-02-14 PROCEDURE — 99212 OFFICE O/P EST SF 10 MIN: CPT | Performed by: RADIOLOGY

## 2023-02-14 NOTE — PROGRESS NOTES
NURSING ASSESSMENT     Date: 2023        Patient Name: Sarahi Edwards     YOB: 1950      Age:  81 y.o. MRN: [id number]       Chaperone [] Yes   [x] No      Advance Directives:   Do you currently have completed advance directives (living will)? [x] Yes   [] No         *If yes, please bring us a copy for your records. *If no, would you like info or assistance in completing advance directives (living will)? [] Yes   [x] No    Pain Score:   Pain Score (1-10): Mild now, was moderate to severe 1 month ago. Pain Location: Bilat knee left worse than right since the end of December after twisting left knee   Pain Duration: Daily   Pain Management/Control: NA      Is pain affecting your ability to take care of yourself or move throughout your home? [] Yes   [x] No  Walks 2-3 miles daily, but was walking up 6-7 miles daily before Ferdinand    General: No Problems  Patient has gained weight [] Yes   [x] No  Patient has lost weight [x] Yes   [] No  How much weight in pounds and over what length of time: Down 2 lbs since last visit 11/15/22    Eyes (Ophthalmic): Wears glasses     Skin (Dermatological): No Problems     ENT: Mild loss of hearing     Respiratory: No Problems     Cardiovascular: No Problems      Device   [] Yes   [x] No   Copy of Card Obtained [] Yes   [x] No    Gastrointestinal: Constipation and Diarrhea intermittent depending on what he eats. Hemorrhoids that bleed at times.     Genito-Urinary:       Frequency- goes more during the day in the last month or two    Nocturia times 1    Incontinence-Urge   Urgency with some dribbling      Breast: No Problems     Musculoskeletal: Joint Pain-mild bilat knee discomfort, left worse than right    Neurological: No Problems      Hematological and Lymphatic: Easy Bruising-takes Eliquis     Endocrine: No Problems     Gyn History:   /Para:   Age of Menarche:   Age of Menopause  Vaginal Bleeding:    First Pregnancy:  Breast Feeding:    Last Menstrual period:   Oral Contraceptives:      Number of years:   Hormone Replacement therapy     Number of Years:   Last Pap Smear:   Last Mammogram    A 10-point review of systems  has been conducted and pertinent positives have been   recorded. All other review of systems are negative    Was the patient admitted during the course of treatment OR within 30 days of treatment?   No        Additional Comments:

## 2023-02-21 DIAGNOSIS — C61 PROSTATE CANCER (HCC): ICD-10-CM

## 2023-02-21 DIAGNOSIS — R97.20 ELEVATED PSA: ICD-10-CM

## 2023-02-21 LAB — PROSTATE SPECIFIC ANTIGEN: 3.58 NG/ML (ref 0–4)

## 2023-03-15 ENCOUNTER — HOSPITAL ENCOUNTER (OUTPATIENT)
Dept: RADIATION ONCOLOGY | Age: 73
Discharge: HOME | End: 2023-03-15
Attending: RADIOLOGY
Payer: MEDICARE

## 2023-03-15 VITALS
DIASTOLIC BLOOD PRESSURE: 63 MMHG | OXYGEN SATURATION: 98 % | HEART RATE: 70 BPM | BODY MASS INDEX: 27.89 KG/M2 | RESPIRATION RATE: 16 BRPM | WEIGHT: 183.4 LBS | SYSTOLIC BLOOD PRESSURE: 110 MMHG | TEMPERATURE: 97.7 F

## 2023-03-15 DIAGNOSIS — C61 PROSTATE CANCER (HCC): Primary | ICD-10-CM

## 2023-03-15 DIAGNOSIS — R97.20 ELEVATED PSA: ICD-10-CM

## 2023-03-15 PROCEDURE — 99212 OFFICE O/P EST SF 10 MIN: CPT | Performed by: RADIOLOGY

## 2023-03-15 NOTE — PROGRESS NOTES
NURSING ASSESSMENT     Date: 3/15/2023        Patient Name: Isael Mae     YOB: 1950      Age:  81 y.o. MRN: [id number]       Chaperone [] Yes   [x] No      Advance Directives:   Do you currently have completed advance directives (living will)? [x] Yes   [] No         *If yes, please bring us a copy for your records. *If no, would you like info or assistance in completing advance directives (living will)? [] Yes   [] No    Pain Score: Intermittent burning with urination. \"Noticeable but not painful\". General: No Problems  Patient has gained weight [] Yes   [x] No  Patient has lost weight [] Yes   [x] No  How much weight in pounds and over what length of time:     Eyes (Ophthalmic): No Problem     Skin (Dermatological): No changes, dark discolored loosening nail to left middle finger from injury. ENT: No Problems     Respiratory: No Problems     Cardiovascular: No Problems      Device   [] Yes   [x] No   Copy of Card Obtained [] Yes   [] No    Gastrointestinal: Constipation with certain foods. Patient is thinking about starting metamucil daily as per PCP recommendation. Genito-Urinary: Intermittent burning with urination. Patient states he notices increased urgency and frequency.      Breast: No Problems     Musculoskeletal: Joint Pain with walking    Neurological: No Problems      Hematological and Lymphatic: Easy Bruising reports from taking anticoagulant     Endocrine: No Problems

## 2023-03-17 RX ORDER — SULFAMETHOXAZOLE AND TRIMETHOPRIM 800; 160 MG/1; MG/1
1 TABLET ORAL 2 TIMES DAILY
Qty: 60 TABLET | Refills: 0 | Status: SHIPPED | OUTPATIENT
Start: 2023-03-17 | End: 2023-04-16

## 2023-03-19 NOTE — PROGRESS NOTES
[facility]           Radiation Oncology      [address] [facility]        [address]        Stephen Chiu: [phone]        F: [phone]       [employer]                   Dr. Luba Negro MD PhD    FOLLOW-UP NOTE     Date of Service: 3/15/2023  Patient ID: Jessa Mail   : 1950  MRN: [id number]   Acct Number: [de-identified]       NAME:  Jessa Mail    :  1950 81 y.o. male     PCP: Raina Smiley MD    REFERRING PROVIDER: Emerald Thomas    DIAGNOSIS:  1. Prostate cancer ([facility] [address]    2. Elevated PSA        STAGING: Cancer Staging  Prostate cancer [facility])  Staging form: Prostate, AJCC 8th Edition  - Clinical stage from 2019: Stage I (cT2a, cN0, cM0, PSA: 7.4, Grade Group: 1) - Signed by Carter Harris MD on 2019      PRIOR TREATMENT: Transperineal ultrasound guided prostate brachytherapy, Iodine 125, 145Gy, implanted 65 seeds using 20 needles. Implant date 20. SpaceOAR gel placed 20. TIME SINCE TREATMENT: Approximately 27 months     RECENT HISTORY: Kell Fish returns for follow up status post completion of definitive radiation therapy with iodine 125 low-dose rate intraprostatic brachytherapy permanent implant. He does report some urinary symptoms including occasional dysuria a few times per week but otherwise reports that his urinary symptoms have been stable. His IPSS score today is 5. He had a repeat PSA done on 10/20/22 which was 2.75, representing a persistent rise over the past several readings. Previous value on 22 was 1.50, on 10/19/2021 it was 0.98 and on 2021 it was 0.64. Since he was last seen he did have a bone scan performed on 2022 which was negative for bone metastases. On 2022, he had a PSMA PET scan performed which revealed increased F-18 PSMA uptake within the left superolateral prostate gland with possible extension into the left seminal vesicle that is suggestive of residual disease.   There is no evidence for F-18 uptake in the regional lymph nodes. There was mild increase in F-18 PSMA uptake seen in the left anterior fifth rib and mild increase in uptake in the left anterolateral scapula. The patient underwent a prostate biopsy on 1/18/2023 which is essentially negative for any active disease. He did see urology and discussed treatment options. The patient returns today to review radiation therapy options. His most recent PSA on 2/21/2023 was 3.58. Past medical, surgical, social and family histories reviewed and updated as indicated. ALLERGIES:  Ciprofloxacin, Penicillins, and Sulfamethoxazole-trimethoprim       MEDICATIONS:   Current Outpatient Medications:     sulfamethoxazole-trimethoprim (BACTRIM DS;SEPTRA DS) 800-160 MG per tablet, Take 1 tablet by mouth 2 times daily, Disp: 60 tablet, Rfl: 0    Coenzyme Q10 (CO Q-10) 100 MG CAPS, Take by mouth, Disp: , Rfl:     atorvastatin (LIPITOR) 80 MG tablet, Take 1/2 tablet by mouth once daily. , Disp: , Rfl:     lisinopril (PRINIVIL;ZESTRIL) 2.5 MG tablet, 2.5 mg , Disp: , Rfl: 5    metoprolol succinate (TOPROL XL) 25 MG extended release tablet, Take 1 tablet by mouth daily, Disp: 90 tablet, Rfl: 3    prasugrel (EFFIENT) 10 MG TABS, Take 1 tablet by mouth daily, Disp: 90 tablet, Rfl: 3    nitroGLYCERIN (NITROSTAT) 0.4 MG SL tablet, Place 1 tablet under the tongue every 5 minutes as needed for Chest pain (Patient not taking: No sig reported), Disp: 25 tablet, Rfl: 2    glucosamine-chondroitin 500-400 MG CAPS, Take by mouth daily, Disp: , Rfl:     aspirin (ASPIR-LOW) 81 MG EC tablet, Take 1 tablet by mouth daily, Disp: 30 tablet, Rfl: 11    Review of Systems   All other systems reviewed and are negative.      PHYSICAL EXAMINATION:      Vitals:    03/15/23 1431   BP: 110/63   Pulse: 70   Resp: 16   Temp: 97.7 °F (36.5 °C)   TempSrc: Temporal   SpO2: 98%   Weight: 183 lb 6.4 oz (83.2 kg)       Wt Readings from Last 3 Encounters:   03/15/23 183 lb 6.4 oz (83.2 kg) 02/14/23 184 lb 6.4 oz (83.6 kg)   11/15/22 186 lb 9.6 oz (84.6 kg)          ECOG PERFORMANCE STATUS:  1     Physical Exam  Vitals and nursing note reviewed. Constitutional:       Appearance: Normal appearance. Comments: He is present unaccompanied   HENT:      Head: Normocephalic and atraumatic. Eyes:      General: No scleral icterus. Extraocular Movements: Extraocular movements intact. Cardiovascular:      Heart sounds: Normal heart sounds. Pulmonary:      Breath sounds: Normal breath sounds. Abdominal:      Palpations: Abdomen is soft. Musculoskeletal:         General: No swelling. Normal range of motion. Cervical back: Normal range of motion and neck supple. Right lower leg: No edema. Left lower leg: No edema. Skin:     General: Skin is warm and dry. Neurological:      General: No focal deficit present. Mental Status: He is alert and oriented to person, place, and time. Psychiatric:         Mood and Affect: Mood normal.         Behavior: Behavior normal.    ASSESSMENT/PLAN:   This is a 82-year-old gentleman with a history of  [id number] favorable intermediate risk prostate cancer, PSA 11.17, Ayo 3+3=6 without PNI, 2/12 cores+, 28.5cc at biopsy. PSA romaine under watchful waiting. He underwent a iodine-125 permanent prostate seed implant in August 2020 and returns for routine follow-up approximately 27 months after completing radiation therapy. He denies any new urinary symptoms at this time but has been reporting some blood in his stool, perhaps due to hemorrhoids. His PSA from last month has shown a persistent rise. While PSMA-PET revealed areas of activity, repeat biopsy did not show active disease. I again reviewed with the patient that while biopsy did not show any areas of active disease, there is limitations in this as he was unable to have an MRI guided fusion biopsy and therefore there is a high likelihood of active disease having been missed. Furthermore his imaging and his PSA are telling a different story, 1 that includes biochemical recurrence. I again reviewed the role of reirradiation therapy with stereotactic body radiation therapy to the whole prostate and seminal vesicles and reviewed toxicity of this. The patient would like to try 1 more intervention rule out the possibility that prostatitis may be contributing to his rise in PSA. While I explained that this is unlikely to be prostatitis given the lack of his of his other symptoms and negative urinalysis and culture previously, I did feel that there was little downside in waiting an additional month. I will have him return for a follow-up in 1 month after he has completed standard treatment for prostatitis with antibiotic and anti-inflammatory with a repeat PSA at that time. Should it remain elevated or even higher we would move forward with reirradiation therapy as discussed above. In the interim the patient knows to remain available should he have any additional questions or concerns    ORDERS: Repeat serum PSA; Bactrim DS twice daily x30 days    FOLLOW-UP: Return for routine follow-up on 4/26/2023 with repeat PSA.     Olivia Villa MD PhD  Radiation Oncologist, [address] [place] [address]

## 2023-03-28 ENCOUNTER — OFFICE VISIT (OUTPATIENT)
Dept: FAMILY MEDICINE CLINIC | Age: 73
End: 2023-03-28

## 2023-03-28 VITALS
DIASTOLIC BLOOD PRESSURE: 76 MMHG | WEIGHT: 185 LBS | HEIGHT: 68 IN | SYSTOLIC BLOOD PRESSURE: 138 MMHG | TEMPERATURE: 97.8 F | BODY MASS INDEX: 28.04 KG/M2 | OXYGEN SATURATION: 99 % | HEART RATE: 79 BPM

## 2023-03-28 DIAGNOSIS — M25.562 PAIN IN BOTH KNEES, UNSPECIFIED CHRONICITY: ICD-10-CM

## 2023-03-28 DIAGNOSIS — Z85.46 HISTORY OF PROSTATE CANCER: ICD-10-CM

## 2023-03-28 DIAGNOSIS — I10 ESSENTIAL HYPERTENSION: ICD-10-CM

## 2023-03-28 DIAGNOSIS — I25.10 CORONARY ARTERY DISEASE INVOLVING NATIVE HEART, UNSPECIFIED VESSEL OR LESION TYPE, UNSPECIFIED WHETHER ANGINA PRESENT: Primary | ICD-10-CM

## 2023-03-28 DIAGNOSIS — E78.2 MODERATE MIXED HYPERLIPIDEMIA NOT REQUIRING STATIN THERAPY: ICD-10-CM

## 2023-03-28 DIAGNOSIS — M25.561 PAIN IN BOTH KNEES, UNSPECIFIED CHRONICITY: ICD-10-CM

## 2023-03-28 SDOH — ECONOMIC STABILITY: INCOME INSECURITY: HOW HARD IS IT FOR YOU TO PAY FOR THE VERY BASICS LIKE FOOD, HOUSING, MEDICAL CARE, AND HEATING?: NOT HARD AT ALL

## 2023-03-28 SDOH — ECONOMIC STABILITY: FOOD INSECURITY: WITHIN THE PAST 12 MONTHS, THE FOOD YOU BOUGHT JUST DIDN'T LAST AND YOU DIDN'T HAVE MONEY TO GET MORE.: NEVER TRUE

## 2023-03-28 SDOH — ECONOMIC STABILITY: HOUSING INSECURITY
IN THE LAST 12 MONTHS, WAS THERE A TIME WHEN YOU DID NOT HAVE A STEADY PLACE TO SLEEP OR SLEPT IN A SHELTER (INCLUDING NOW)?: NO

## 2023-03-28 SDOH — ECONOMIC STABILITY: FOOD INSECURITY: WITHIN THE PAST 12 MONTHS, YOU WORRIED THAT YOUR FOOD WOULD RUN OUT BEFORE YOU GOT MONEY TO BUY MORE.: NEVER TRUE

## 2023-03-28 SDOH — ECONOMIC STABILITY: TRANSPORTATION INSECURITY
IN THE PAST 12 MONTHS, HAS LACK OF TRANSPORTATION KEPT YOU FROM MEETINGS, WORK, OR FROM GETTING THINGS NEEDED FOR DAILY LIVING?: NO

## 2023-03-28 ASSESSMENT — ENCOUNTER SYMPTOMS
EYE DISCHARGE: 0
TROUBLE SWALLOWING: 0
EYE PAIN: 0
SINUS PAIN: 0
BLOOD IN STOOL: 0
RECTAL PAIN: 0
SORE THROAT: 0
SINUS PRESSURE: 0
NAUSEA: 0
FACIAL SWELLING: 0
COLOR CHANGE: 0
ABDOMINAL DISTENTION: 0
WHEEZING: 0
APNEA: 0
COUGH: 0
VOICE CHANGE: 0
ABDOMINAL PAIN: 0
RHINORRHEA: 0
EYE REDNESS: 0
EYE ITCHING: 0
PHOTOPHOBIA: 0
SHORTNESS OF BREATH: 0
VOMITING: 0
DIARRHEA: 0
CONSTIPATION: 0
CHEST TIGHTNESS: 0
BACK PAIN: 0

## 2023-03-28 ASSESSMENT — PATIENT HEALTH QUESTIONNAIRE - PHQ9
1. LITTLE INTEREST OR PLEASURE IN DOING THINGS: 0
SUM OF ALL RESPONSES TO PHQ QUESTIONS 1-9: 0
2. FEELING DOWN, DEPRESSED OR HOPELESS: 0
SUM OF ALL RESPONSES TO PHQ QUESTIONS 1-9: 0
SUM OF ALL RESPONSES TO PHQ9 QUESTIONS 1 & 2: 0

## 2023-03-28 NOTE — PROGRESS NOTES
prostate massage and obtain a urinalysis and culture        Bilateral knee pain: Referral to orthopedic surgery after obtaining an x-ray of the knees bilaterally      Coronary artery disease involving native heart, unspecified vessel or lesion type, unspecified whether angina present    -Continue aspirin, Effient and metoprolol    Essential hypertension-continue Zestril 2.5 mg orally daily    Prostate cancer (HCC)-follow-up with urology    Moderate mixed hyperlipidemia-continue Lipitor    Maintenance we will refer the patient to gastroenterology for colorectal cancer screening and administer pneumococcal vaccination    No follow-ups on file. HM : due for colonoscpy    --Quang Dumont MD on 3/28/2023 at 8:42 PM    An electronic signature was used to authenticate this note. On this date 03/28/23 I have spent 30 minutes reviewing previous notes, test results and face to face with the patient discussing the diagnosis and importance of compliance with the treatment plan.      Quang Dumont MD

## 2023-03-29 ENCOUNTER — TELEPHONE (OUTPATIENT)
Dept: FAMILY MEDICINE CLINIC | Age: 73
End: 2023-03-29

## 2023-03-29 DIAGNOSIS — Z85.46 HISTORY OF PROSTATE CANCER: ICD-10-CM

## 2023-03-29 DIAGNOSIS — Z85.46 HISTORY OF PROSTATE CANCER: Primary | ICD-10-CM

## 2023-03-29 LAB
BILIRUB UR QL STRIP: NEGATIVE
CLARITY UR: ABNORMAL
COLOR UR: YELLOW
GLUCOSE UR STRIP-MCNC: NEGATIVE MG/DL
HGB UR QL STRIP: NEGATIVE
KETONES UR STRIP-MCNC: NEGATIVE MG/DL
LEUKOCYTE ESTERASE UR QL STRIP: NEGATIVE
NITRITE UR QL STRIP: NEGATIVE
PH UR STRIP: 5 [PH] (ref 5–9)
PROT UR STRIP-MCNC: NEGATIVE MG/DL
SP GR UR STRIP: 1.02 (ref 1–1.03)
URINE REFLEX TO CULTURE: ABNORMAL
UROBILINOGEN UR STRIP-ACNC: 0.2 E.U./DL

## 2023-04-04 ENCOUNTER — TELEPHONE (OUTPATIENT)
Dept: FAMILY MEDICINE CLINIC | Age: 73
End: 2023-04-04

## 2023-04-11 DIAGNOSIS — C61 PROSTATE CANCER (HCC): ICD-10-CM

## 2023-04-11 DIAGNOSIS — R97.20 ELEVATED PSA: ICD-10-CM

## 2023-04-11 LAB — PSA SERPL-MCNC: 4.7 NG/ML (ref 0–4)

## 2023-04-18 ENCOUNTER — OFFICE VISIT (OUTPATIENT)
Dept: ORTHOPEDIC SURGERY | Age: 73
End: 2023-04-18

## 2023-04-18 DIAGNOSIS — M25.561 RIGHT KNEE PAIN, UNSPECIFIED CHRONICITY: ICD-10-CM

## 2023-04-18 DIAGNOSIS — M25.562 LEFT KNEE PAIN, UNSPECIFIED CHRONICITY: ICD-10-CM

## 2023-04-18 DIAGNOSIS — M17.0 PRIMARY OSTEOARTHRITIS OF BOTH KNEES: Primary | ICD-10-CM

## 2023-04-18 ASSESSMENT — ENCOUNTER SYMPTOMS
EYES NEGATIVE: 1
RESPIRATORY NEGATIVE: 1
ALLERGIC/IMMUNOLOGIC NEGATIVE: 1
GASTROINTESTINAL NEGATIVE: 1

## 2023-04-18 NOTE — PROGRESS NOTES
bracing, anti-inflammatory medication, physical therapy, and cortisone injections. He states that he would like to hold off on all of these right now as he feels his knees are not bad enough. He states that he walks 5 miles per day and he is going to consider getting a knee brace for days when his knee is slightly more painful. He is going to follow-up with me as needed. Orders Placed This Encounter   Procedures    XR KNEE RIGHT (MIN 4 VIEWS)     Standing Status:   Future     Number of Occurrences:   1     Standing Expiration Date:   4/17/2024     Scheduling Instructions:      AP weightbearing bilateral knees with marker, lateral of right knee with marker, merchant of bilateral knee, tunnel of bilateral knee     Order Specific Question:   Reason for exam:     Answer:   Knee pain    XR KNEE LEFT (MIN 4 VIEWS)     Standing Status:   Future     Number of Occurrences:   1     Standing Expiration Date:   4/17/2024     Scheduling Instructions:      Bilateral WB AP, lateral left knee, bilateral skyline, bilateral tunnel     Order Specific Question:   Reason for exam:     Answer:   knee pain     Return if symptoms worsen or fail to improve.     Alejandra Edwards MD

## 2023-04-19 RX ORDER — OXYCODONE AND ACETAMINOPHEN 10; 325 MG/1; MG/1
1 TABLET ORAL ONCE
Qty: 1 TABLET | Refills: 0 | Status: SHIPPED | OUTPATIENT
Start: 2023-04-19 | End: 2023-04-19

## 2023-04-19 RX ORDER — LORAZEPAM 1 MG/1
1 TABLET ORAL ONCE
Qty: 1 TABLET | Refills: 0 | Status: SHIPPED | OUTPATIENT
Start: 2023-04-19 | End: 2023-04-19

## 2023-04-19 RX ORDER — LEVOFLOXACIN 500 MG/1
500 TABLET, FILM COATED ORAL DAILY
Qty: 1 TABLET | Refills: 0 | Status: SHIPPED | OUTPATIENT
Start: 2023-04-19 | End: 2023-04-20

## 2023-04-21 ENCOUNTER — HOSPITAL ENCOUNTER (OUTPATIENT)
Dept: RADIATION ONCOLOGY | Age: 73
Discharge: HOME | End: 2023-04-21
Attending: RADIOLOGY
Payer: MEDICARE

## 2023-04-21 ENCOUNTER — HOSPITAL ENCOUNTER (OUTPATIENT)
Dept: RADIATION ONCOLOGY | Age: 73
Discharge: HOME | End: 2023-04-21
Payer: MEDICARE

## 2023-04-21 DIAGNOSIS — C61 PROSTATE CANCER (HCC): ICD-10-CM

## 2023-04-21 DIAGNOSIS — R97.20 ELEVATED PSA: Primary | ICD-10-CM

## 2023-04-21 NOTE — PROGRESS NOTES
[address] [facility]           Radiation Oncology      [address] [facility]        [address]        Juan Carlos Clarkeu: [phone]        F: [phone]       [email or web address]                   Dr. Enoch Farris MD PhD    PROCEDURE NOTE     Date of Service: 2023  Patient ID: Jj Amador   : 1950  MRN: [id number]   Acct Number: [de-identified]     Philip Suarez  72 y.o.   1950    REFERRING PROVIDER: Phuong Nolasco    PCP:  Adia Wilkerson MD    DIAGNOSIS:   1. Elevated PSA    2. [facility]        STAGING: Cancer Staging   Prostate cancer [facility])  Staging form: Prostate, AJCC 8th Edition  - Clinical stage from 2019: Stage I (cT2a, cN0, cM0, PSA: 7.4, Grade Group: 1) - Signed by Evelio Pierre MD on 2019      PROCEDURE: The patient was placed in the dorsal lithotomy position on the examination table. Genitalia were mobilized out of the perineal field. The perineum was then sterilized with Betadine. The perineum was anesthetized with 1% lidocaine for injection with epinephrine injecting a total of 5 cc of a solution within the superficial subcutaneous tissues of the perineum to help create a superficial block. I then placed the rectal probe in position so the prostate was easily visualized, and once the prostate was in place and midline, I locked the ultrasound probe into the stepper unit. A prostate apical block was then performed using the remaining 1% lidocaine with epinephrine injecting an additional 5 to 6 cc into the prostate apex along the expected needle tracks and near the pudendal nerves. Fiducials were not placed as the patient had previous brachytherapy. At this point a needle was positioned in the periprosthetic fat between the prostate and rectum and 1-2 cc of sterile normal saline was injected to separate the tissue planes. An additional 1 to 2 cc of buffered lidocaine solution was injected along the similar plane done under sagittal and axial imaging.   After

## 2023-04-26 ENCOUNTER — OFFICE VISIT (OUTPATIENT)
Dept: FAMILY MEDICINE CLINIC | Age: 73
End: 2023-04-26

## 2023-04-26 VITALS
HEART RATE: 71 BPM | WEIGHT: 187 LBS | SYSTOLIC BLOOD PRESSURE: 138 MMHG | HEIGHT: 68 IN | DIASTOLIC BLOOD PRESSURE: 80 MMHG | RESPIRATION RATE: 14 BRPM | OXYGEN SATURATION: 98 % | BODY MASS INDEX: 28.34 KG/M2

## 2023-04-26 VITALS
BODY MASS INDEX: 28.34 KG/M2 | HEIGHT: 68 IN | OXYGEN SATURATION: 98 % | SYSTOLIC BLOOD PRESSURE: 138 MMHG | WEIGHT: 187 LBS | DIASTOLIC BLOOD PRESSURE: 80 MMHG | RESPIRATION RATE: 14 BRPM | HEART RATE: 71 BPM

## 2023-04-26 DIAGNOSIS — I10 ESSENTIAL HYPERTENSION: Primary | ICD-10-CM

## 2023-04-26 DIAGNOSIS — I25.10 CORONARY ARTERY DISEASE INVOLVING NATIVE HEART, UNSPECIFIED VESSEL OR LESION TYPE, UNSPECIFIED WHETHER ANGINA PRESENT: ICD-10-CM

## 2023-04-26 DIAGNOSIS — Z00.00 MEDICARE ANNUAL WELLNESS VISIT, SUBSEQUENT: Primary | ICD-10-CM

## 2023-04-26 DIAGNOSIS — Z87.891 HISTORY OF NICOTINE DEPENDENCE: ICD-10-CM

## 2023-04-26 DIAGNOSIS — I10 ESSENTIAL HYPERTENSION: ICD-10-CM

## 2023-04-26 DIAGNOSIS — E78.2 MODERATE MIXED HYPERLIPIDEMIA NOT REQUIRING STATIN THERAPY: ICD-10-CM

## 2023-04-26 DIAGNOSIS — Z87.891 PERSONAL HISTORY OF TOBACCO USE: ICD-10-CM

## 2023-04-26 DIAGNOSIS — C61 PROSTATE CANCER (HCC): ICD-10-CM

## 2023-04-26 LAB
ALBUMIN SERPL-MCNC: 4 G/DL (ref 3.5–4.6)
ALP SERPL-CCNC: 103 U/L (ref 35–104)
ALT SERPL-CCNC: 15 U/L (ref 0–41)
ANION GAP SERPL CALCULATED.3IONS-SCNC: 10 MEQ/L (ref 9–15)
AST SERPL-CCNC: 16 U/L (ref 0–40)
BASOPHILS # BLD: 0 K/UL (ref 0–0.2)
BASOPHILS NFR BLD: 0.7 %
BILIRUB SERPL-MCNC: 0.4 MG/DL (ref 0.2–0.7)
BUN SERPL-MCNC: 17 MG/DL (ref 8–23)
CALCIUM SERPL-MCNC: 9 MG/DL (ref 8.5–9.9)
CHLORIDE SERPL-SCNC: 107 MEQ/L (ref 95–107)
CO2 SERPL-SCNC: 28 MEQ/L (ref 20–31)
CREAT SERPL-MCNC: 0.91 MG/DL (ref 0.7–1.2)
EOSINOPHIL # BLD: 0.2 K/UL (ref 0–0.7)
EOSINOPHIL NFR BLD: 3 %
ERYTHROCYTE [DISTWIDTH] IN BLOOD BY AUTOMATED COUNT: 14.2 % (ref 11.5–14.5)
GLOBULIN SER CALC-MCNC: 3.1 G/DL (ref 2.3–3.5)
GLUCOSE SERPL-MCNC: 106 MG/DL (ref 70–99)
HCT VFR BLD AUTO: 44.7 % (ref 42–52)
HGB BLD-MCNC: 15.3 G/DL (ref 14–18)
LYMPHOCYTES # BLD: 1.1 K/UL (ref 1–4.8)
LYMPHOCYTES NFR BLD: 16.7 %
MCH RBC QN AUTO: 32.6 PG (ref 27–31.3)
MCHC RBC AUTO-ENTMCNC: 34.3 % (ref 33–37)
MCV RBC AUTO: 95.3 FL (ref 79–92.2)
MONOCYTES # BLD: 0.4 K/UL (ref 0.2–0.8)
MONOCYTES NFR BLD: 6.7 %
NEUTROPHILS # BLD: 4.8 K/UL (ref 1.4–6.5)
NEUTS SEG NFR BLD: 72.9 %
PLATELET # BLD AUTO: 179 K/UL (ref 130–400)
POTASSIUM SERPL-SCNC: 4.6 MEQ/L (ref 3.4–4.9)
PROT SERPL-MCNC: 7.1 G/DL (ref 6.3–8)
PSA SERPL-MCNC: 4.37 NG/ML (ref 0–4)
RBC # BLD AUTO: 4.69 M/UL (ref 4.7–6.1)
SODIUM SERPL-SCNC: 145 MEQ/L (ref 135–144)
WBC # BLD AUTO: 6.6 K/UL (ref 4.8–10.8)

## 2023-04-26 ASSESSMENT — ENCOUNTER SYMPTOMS
SINUS PAIN: 0
CONSTIPATION: 0
COLOR CHANGE: 0
ABDOMINAL PAIN: 0
FACIAL SWELLING: 0
VOICE CHANGE: 0
BLOOD IN STOOL: 0
EYE PAIN: 0
APNEA: 0
EYE ITCHING: 0
DIARRHEA: 0
VOMITING: 0
WHEEZING: 0
ABDOMINAL DISTENTION: 0
EYE DISCHARGE: 0
RECTAL PAIN: 0
TROUBLE SWALLOWING: 0
NAUSEA: 0
BACK PAIN: 0
EYE REDNESS: 0
COUGH: 0
PHOTOPHOBIA: 0
CHEST TIGHTNESS: 0
RHINORRHEA: 0
SHORTNESS OF BREATH: 0
SORE THROAT: 0
SINUS PRESSURE: 0

## 2023-04-26 ASSESSMENT — PATIENT HEALTH QUESTIONNAIRE - PHQ9
SUM OF ALL RESPONSES TO PHQ QUESTIONS 1-9: 0
SUM OF ALL RESPONSES TO PHQ9 QUESTIONS 1 & 2: 0
SUM OF ALL RESPONSES TO PHQ QUESTIONS 1-9: 0
2. FEELING DOWN, DEPRESSED OR HOPELESS: 0
SUM OF ALL RESPONSES TO PHQ QUESTIONS 1-9: 0
SUM OF ALL RESPONSES TO PHQ QUESTIONS 1-9: 0
1. LITTLE INTEREST OR PLEASURE IN DOING THINGS: 0

## 2023-04-26 NOTE — PROGRESS NOTES
Subjective:      Patient ID: Sadia Yang is a 72 y.o. male New patient, here for evaluation of the following chief complaint(s):  Chief Complaint   Patient presents with    Follow-up         Hypertension  Pertinent negatives include no chest pain, headaches, neck pain, palpitations or shortness of breath. Knee Pain   Pertinent negatives include no numbness. Rising PSA: The patient's PSA has been increasing over the course of the past 3 measurements. It has been hypothesized  That the prostatitis might be the cause of the patient's rising PSA. Antibiotics were indicated and the patient had a possible allergic reaction to Bactrim. It has been recommended that he be seen by me to assess whether the patient's reaction was an allergic reaction or infection. Bilateral knee pain: The patient has been experiencing achy nonradiating bilateral knee pain that has not been relieved by over-the-counter analgesics. Coronary artery disease involving native heart, unspecified vessel or lesion type, unspecified whether angina present-compliant with aspirin 81 mg orally daily, Effient 10 mg orally daily and metoprolol 25 mg orally daily. Essential hypertension-compliant with Zestril 2.5 mg orally daily        Prostate cancer [facility]): strong family history  Colon CA: Maternal Grandmother. Moderate mixed hyperlipidemia not requiring statin therapy       S/p brachy therapy      Health maintenance the patient is due for pneumococcal vaccination at this time. At present he denies polyuria,  Polydipsia, constitutional, sinus, visual, cardiopulmonary, urologic, gastrointestinal, immunologic/hematologic, musculoskeletal, neurologic,dermatologic, or psychiatric complaints. Review of Systems   Constitutional:  Negative for chills, diaphoresis, fatigue and fever.    HENT:  Negative for congestion, dental problem, drooling, ear discharge, ear pain, facial swelling, hearing loss, mouth sores, nosebleeds,

## 2023-04-27 NOTE — PROGRESS NOTES
Medicare Annual Wellness Visit    Zaida Desai is here for Medicare AWV    Assessment & Plan   Medicare annual wellness visit, subsequent      Recommendations for Preventive Services Due: see orders and patient instructions/AVS.  Recommended screening schedule for the next 5-10 years is provided to the patient in written form: see Patient Instructions/AVS.     No follow-ups on file. Subjective   The following acute and/or chronic problems were also addressed today:    Patient's complete Health Risk Assessment and screening values have been reviewed and are found in Flowsheets. The following problems were reviewed today and where indicated follow up appointments were made and/or referrals ordered. Positive Risk Factor Screenings with Interventions:                  Dentist Screen:  Have you seen the dentist within the past year?: (!) No        Objective   Vitals:    04/26/23 1305   BP: 138/80   Pulse: 71   Resp: 14   SpO2: 98%   Weight: 187 lb (84.8 kg)   Height: 5' 8\" (1.727 m)      Body mass index is 28.43 kg/m².         General Appearance: alert and oriented to person, place and time, well developed and well- nourished, in no acute distress  Skin: warm and dry, no rash or erythema  Head: normocephalic and atraumatic  Eyes: pupils equal, round, and reactive to light, extraocular eye movements intact, conjunctivae normal  ENT: tympanic membrane, external ear and ear canal normal bilaterally, nose without deformity, nasal mucosa and turbinates normal without polyps  Neck: supple and non-tender without mass, no thyromegaly or thyroid nodules, no cervical lymphadenopathy  Pulmonary/Chest: clear to auscultation bilaterally- no wheezes, rales or rhonchi, normal air movement, no respiratory distress  Cardiovascular: normal rate, regular rhythm, normal S1 and S2, no murmurs, rubs, clicks, or gallops, distal pulses intact, no carotid bruits  Abdomen: soft, non-tender, non-distended, normal bowel sounds, no masses or

## 2023-04-27 NOTE — PATIENT INSTRUCTIONS

## 2023-05-02 RX ORDER — SODIUM, POTASSIUM,MAG SULFATES 17.5-3.13G
SOLUTION, RECONSTITUTED, ORAL ORAL
Qty: 2 EACH | Refills: 0 | Status: SHIPPED | OUTPATIENT
Start: 2023-05-02

## 2023-05-05 ENCOUNTER — PREP FOR PROCEDURE (OUTPATIENT)
Dept: ENDOSCOPY | Age: 73
End: 2023-05-05

## 2023-05-09 RX ORDER — SODIUM CHLORIDE 9 MG/ML
25 INJECTION, SOLUTION INTRAVENOUS PRN
Status: CANCELLED | OUTPATIENT
Start: 2023-05-09

## 2023-05-09 RX ORDER — SODIUM CHLORIDE 0.9 % (FLUSH) 0.9 %
5-40 SYRINGE (ML) INJECTION EVERY 12 HOURS SCHEDULED
Status: CANCELLED | OUTPATIENT
Start: 2023-05-09

## 2023-05-09 RX ORDER — SODIUM CHLORIDE 9 MG/ML
INJECTION, SOLUTION INTRAVENOUS CONTINUOUS
Status: CANCELLED | OUTPATIENT
Start: 2023-05-09

## 2023-05-09 RX ORDER — SODIUM CHLORIDE 0.9 % (FLUSH) 0.9 %
5-40 SYRINGE (ML) INJECTION PRN
Status: CANCELLED | OUTPATIENT
Start: 2023-05-09

## 2023-05-10 ENCOUNTER — ANESTHESIA EVENT (OUTPATIENT)
Dept: ENDOSCOPY | Age: 73
End: 2023-05-10
Payer: MEDICARE

## 2023-05-10 ENCOUNTER — ANESTHESIA (OUTPATIENT)
Dept: ENDOSCOPY | Age: 73
End: 2023-05-10
Payer: MEDICARE

## 2023-05-10 ENCOUNTER — HOSPITAL ENCOUNTER (OUTPATIENT)
Age: 73
Setting detail: OUTPATIENT SURGERY
Discharge: HOME | End: 2023-05-10
Attending: INTERNAL MEDICINE | Admitting: INTERNAL MEDICINE
Payer: MEDICARE

## 2023-05-10 VITALS
BODY MASS INDEX: 27.28 KG/M2 | OXYGEN SATURATION: 97 % | WEIGHT: 180 LBS | SYSTOLIC BLOOD PRESSURE: 124 MMHG | HEIGHT: 68 IN | DIASTOLIC BLOOD PRESSURE: 68 MMHG | RESPIRATION RATE: 16 BRPM | HEART RATE: 65 BPM | TEMPERATURE: 97.5 F

## 2023-05-10 DIAGNOSIS — Z12.11 COLON CANCER SCREENING: ICD-10-CM

## 2023-05-10 PROBLEM — K63.5 POLYP OF COLON: Status: ACTIVE | Noted: 2023-05-10

## 2023-05-10 PROCEDURE — 6370000000 HC RX 637 (ALT 250 FOR IP): Performed by: INTERNAL MEDICINE

## 2023-05-10 PROCEDURE — 3700000001 HC ADD 15 MINUTES (ANESTHESIA): Performed by: INTERNAL MEDICINE

## 2023-05-10 PROCEDURE — 45380 COLONOSCOPY AND BIOPSY: CPT | Performed by: INTERNAL MEDICINE

## 2023-05-10 PROCEDURE — 3700000000 HC ANESTHESIA ATTENDED CARE: Performed by: INTERNAL MEDICINE

## 2023-05-10 PROCEDURE — 6360000002 HC RX W HCPCS: Performed by: NURSE ANESTHETIST, CERTIFIED REGISTERED

## 2023-05-10 PROCEDURE — 2709999900 HC NON-CHARGEABLE SUPPLY: Performed by: INTERNAL MEDICINE

## 2023-05-10 PROCEDURE — 2580000003 HC RX 258: Performed by: INTERNAL MEDICINE

## 2023-05-10 PROCEDURE — 2500000003 HC RX 250 WO HCPCS: Performed by: NURSE ANESTHETIST, CERTIFIED REGISTERED

## 2023-05-10 PROCEDURE — 88305 TISSUE EXAM BY PATHOLOGIST: CPT

## 2023-05-10 PROCEDURE — 7100000011 HC PHASE II RECOVERY - ADDTL 15 MIN: Performed by: INTERNAL MEDICINE

## 2023-05-10 PROCEDURE — 7100000010 HC PHASE II RECOVERY - FIRST 15 MIN: Performed by: INTERNAL MEDICINE

## 2023-05-10 PROCEDURE — 3609027000 HC COLONOSCOPY: Performed by: INTERNAL MEDICINE

## 2023-05-10 RX ORDER — SODIUM CHLORIDE 9 MG/ML
INJECTION, SOLUTION INTRAVENOUS CONTINUOUS
Status: DISCONTINUED | OUTPATIENT
Start: 2023-05-10 | End: 2023-05-10 | Stop reason: HOSPADM

## 2023-05-10 RX ORDER — SODIUM CHLORIDE 9 MG/ML
25 INJECTION, SOLUTION INTRAVENOUS PRN
Status: DISCONTINUED | OUTPATIENT
Start: 2023-05-10 | End: 2023-05-10 | Stop reason: HOSPADM

## 2023-05-10 RX ORDER — LIDOCAINE HYDROCHLORIDE 20 MG/ML
INJECTION, SOLUTION INFILTRATION; PERINEURAL PRN
Status: DISCONTINUED | OUTPATIENT
Start: 2023-05-10 | End: 2023-05-10 | Stop reason: SDUPTHER

## 2023-05-10 RX ORDER — SODIUM CHLORIDE 9 MG/ML
INJECTION, SOLUTION INTRAVENOUS PRN
Status: DISCONTINUED | OUTPATIENT
Start: 2023-05-10 | End: 2023-05-10 | Stop reason: HOSPADM

## 2023-05-10 RX ORDER — SODIUM CHLORIDE 0.9 % (FLUSH) 0.9 %
5-40 SYRINGE (ML) INJECTION PRN
Status: DISCONTINUED | OUTPATIENT
Start: 2023-05-10 | End: 2023-05-10 | Stop reason: HOSPADM

## 2023-05-10 RX ORDER — SODIUM CHLORIDE 0.9 % (FLUSH) 0.9 %
5-40 SYRINGE (ML) INJECTION EVERY 12 HOURS SCHEDULED
Status: DISCONTINUED | OUTPATIENT
Start: 2023-05-10 | End: 2023-05-10 | Stop reason: HOSPADM

## 2023-05-10 RX ORDER — PROPOFOL 10 MG/ML
INJECTION, EMULSION INTRAVENOUS PRN
Status: DISCONTINUED | OUTPATIENT
Start: 2023-05-10 | End: 2023-05-10 | Stop reason: SDUPTHER

## 2023-05-10 RX ORDER — SIMETHICONE 20 MG/.3ML
EMULSION ORAL PRN
Status: DISCONTINUED | OUTPATIENT
Start: 2023-05-10 | End: 2023-05-10 | Stop reason: ALTCHOICE

## 2023-05-10 RX ORDER — MAGNESIUM HYDROXIDE 1200 MG/15ML
LIQUID ORAL PRN
Status: DISCONTINUED | OUTPATIENT
Start: 2023-05-10 | End: 2023-05-10 | Stop reason: ALTCHOICE

## 2023-05-10 RX ADMIN — PROPOFOL 250 MG: 10 INJECTION, EMULSION INTRAVENOUS at 07:37

## 2023-05-10 RX ADMIN — SODIUM CHLORIDE 500 ML: 9 INJECTION, SOLUTION INTRAVENOUS at 07:28

## 2023-05-10 RX ADMIN — LIDOCAINE HYDROCHLORIDE 60 MG: 20 INJECTION, SOLUTION INFILTRATION; PERINEURAL at 07:37

## 2023-05-10 ASSESSMENT — PAIN SCALES - GENERAL
PAINLEVEL_OUTOF10: 0

## 2023-05-10 NOTE — ANESTHESIA PRE PROCEDURE
Department of Anesthesiology  Preprocedure Note       Name:  Loc Blakely   Age:  72 y.o.  :  1950                                          MRN:  [id number]         Date:  5/10/2023      Surgeon: Lucia Salcedo):  Aditi Bai MD    Procedure: Procedure(s):  COLORECTAL CANCER SCREENING, NOT HIGH RISK    Medications prior to admission:   Prior to Admission medications    Medication Sig Start Date End Date Taking? Authorizing Provider   sodium-potassium-mag sulfate (SUPREP) 17.5-3.13-1.6 GM/177ML SOLN solution As directed 23   Aditi Bai MD   Coenzyme Q10 (CO Q-10) 100 MG CAPS Take by mouth    Historical Provider, MD   atorvastatin (LIPITOR) 80 MG tablet Take 1/2 tablet by mouth once daily.  10/18/21   Historical Provider, MD   lisinopril (PRINIVIL;ZESTRIL) 2.5 MG tablet 2.5 mg  19   Historical Provider, MD   metoprolol succinate (TOPROL XL) 25 MG extended release tablet Take 1 tablet by mouth daily 3/12/19   Miracle Aguirre MD   prasugrel (EFFIENT) 10 MG TABS Take 1 tablet by mouth daily 3/12/19   Miracle Aguirre MD   nitroGLYCERIN (NITROSTAT) 0.4 MG SL tablet Place 1 tablet under the tongue every 5 minutes as needed for Chest pain  Patient not taking: No sig reported 3/12/19   Miracle Aguirre MD   glucosamine-chondroitin 500-400 MG CAPS Take by mouth daily    Historical Provider, MD   aspirin (ASPIR-LOW) 81 MG EC tablet Take 1 tablet by mouth daily 3/8/17   Miracle Aguirre MD       Current medications:    Current Facility-Administered Medications   Medication Dose Route Frequency Provider Last Rate Last Admin    0.9 % sodium chloride infusion   IntraVENous Continuous Valentin Mireles MD        sodium chloride flush 0.9 % injection 5-40 mL  5-40 mL IntraVENous 2 times per day Aditi Bai MD        sodium chloride flush 0.9 % injection 5-40 mL  5-40 mL IntraVENous PRN Aditi Bai MD        0.9 % sodium chloride infusion  25 mL IntraVENous PRN Aditi Bai MD           Allergies:

## 2023-05-10 NOTE — H&P
Patient Name: Julieta Miner  : 1950  MRN: [id number]  DATE: 05/10/23      ENDOSCOPY  History and Physical    Procedure:    [x] Diagnostic Colonoscopy       [] Screening Colonoscopy  [] EGD      [] ERCP      [] EUS       [] Other    [x] Previous office notes/History and Physical reviewed from the patients chart. Please see EMR for further details of HPI. I have examined the patient's status immediately prior to the procedure and:      Indications/HPI:    []Abdominal Pain   []Cancer- GI/Lung  []Fhx of colon CA/polyps  []History of Polyps   []Bests   []Melena  []Abnormal Imaging   []Dysphagia    []Persistent Pneumonia  []Anemia   []Food Impaction  [x]History of Polyps  []GI Bleed   []Pulmonary nodule/Mass  []Change in bowel habits  []Heartburn/Reflux  []Rectal Bleed (BRBPR)  []Chest Pain - Non Cardiac  []Heme (+) Stool  []Ulcers  []Constipation   []Hemoptysis   []Varices  []Diarrhea   []Hypoxemia  []Nausea/Vomiting   [x]Screening   []Crohns/Colitis  []Other:    Anesthesia:   [x] MAC [] Moderate Sedation   [] General   [] None     ROS: 12 pt Review of Symptoms was negative unless mentioned above    Medications:   Prior to Admission medications    Medication Sig Start Date End Date Taking? Authorizing Provider   sodium-potassium-mag sulfate (SUPREP) 17.5-3.13-1.6 GM/177ML SOLN solution As directed 23   Jayla Kidd MD   Coenzyme Q10 (CO Q-10) 100 MG CAPS Take by mouth    Historical Provider, MD   atorvastatin (LIPITOR) 80 MG tablet Take 1/2 tablet by mouth once daily.  10/18/21   Historical Provider, MD   lisinopril (PRINIVIL;ZESTRIL) 2.5 MG tablet 1 tablet 19   Historical Provider, MD   metoprolol succinate (TOPROL XL) 25 MG extended release tablet Take 1 tablet by mouth daily 3/12/19   Alyse Cisse MD   prasugrel (EFFIENT) 10 MG TABS Take 1 tablet by mouth daily 3/12/19   Alyse Cisse MD   nitroGLYCERIN (NITROSTAT) 0.4 MG SL tablet Place 1 tablet under the tongue every 5 minutes as needed for

## 2023-05-10 NOTE — ANESTHESIA POSTPROCEDURE EVALUATION
Department of Anesthesiology  Postprocedure Note    Patient: Nicki Buchanan  MRN: [id number]  YOB: 1950  Date of evaluation: 5/10/2023      Procedure Summary     Date: 05/10/23 Room / Location: [address] [place] [address] Malon Shone    Anesthesia Start: 5477 Anesthesia Stop:     Procedure: COLORECTAL CANCER SCREENING, NOT HIGH RISK Diagnosis:       Colon cancer screening      (Colon cancer screening [Z12.11])    Surgeons: Yoni Ames MD Responsible Provider: DUNG Ramírez CRNA    Anesthesia Type: MAC ASA Status: 3          Anesthesia Type: No value filed.     Ximena Phase I: Ximena Score: 10    Ximena Phase II:        Anesthesia Post Evaluation    Patient location during evaluation: bedside  Patient participation: complete - patient participated  Level of consciousness: awake and awake and alert  Pain score: 0  Airway patency: patent  Nausea & Vomiting: no nausea and no vomiting  Complications: no  Cardiovascular status: blood pressure returned to baseline and hemodynamically stable  Respiratory status: acceptable and spontaneous ventilation  Hydration status: euvolemic

## 2023-06-09 PROBLEM — Z12.11 COLON CANCER SCREENING: Status: RESOLVED | Noted: 2023-05-10 | Resolved: 2023-06-09

## 2023-07-05 DIAGNOSIS — C61 PROSTATE CANCER (HCC): ICD-10-CM

## 2023-07-05 LAB — PSA SERPL-MCNC: 3.48 NG/ML (ref 0–4)

## 2023-07-05 RX ORDER — LORAZEPAM 1 MG/1
1 TABLET ORAL ONCE
Qty: 1 TABLET | Refills: 0 | Status: SHIPPED | OUTPATIENT
Start: 2023-07-05 | End: 2023-07-05

## 2023-07-05 RX ORDER — LEVOFLOXACIN 500 MG/1
500 TABLET, FILM COATED ORAL ONCE
Qty: 1 TABLET | Refills: 0 | Status: SHIPPED | OUTPATIENT
Start: 2023-07-05 | End: 2023-07-05

## 2023-07-05 RX ORDER — OXYCODONE AND ACETAMINOPHEN 10; 325 MG/1; MG/1
1 TABLET ORAL ONCE
Qty: 1 TABLET | Refills: 0 | Status: SHIPPED | OUTPATIENT
Start: 2023-07-05 | End: 2023-07-05

## 2023-07-07 ENCOUNTER — HOSPITAL ENCOUNTER (OUTPATIENT)
Dept: RADIATION ONCOLOGY | Age: 73
End: 2023-07-07
Payer: MEDICARE

## 2023-07-07 ENCOUNTER — HOSPITAL ENCOUNTER (OUTPATIENT)
Dept: RADIATION ONCOLOGY | Age: 73
Discharge: HOME | End: 2023-07-07

## 2023-07-07 ENCOUNTER — APPOINTMENT (OUTPATIENT)
Dept: RADIATION ONCOLOGY | Age: 73
End: 2023-07-07

## 2023-07-07 DIAGNOSIS — C61 PROSTATE CANCER (HCC): ICD-10-CM

## 2023-07-07 DIAGNOSIS — R97.20 ELEVATED PSA: ICD-10-CM

## 2023-07-07 DIAGNOSIS — C61 PROSTATE CANCER (HCC): Primary | ICD-10-CM

## 2023-07-07 DIAGNOSIS — R97.20 ELEVATED PSA: Primary | ICD-10-CM

## 2023-07-07 PROCEDURE — 99213 OFFICE O/P EST LOW 20 MIN: CPT | Performed by: RADIOLOGY

## 2023-08-21 DIAGNOSIS — R97.20 ELEVATED PSA: ICD-10-CM

## 2023-08-21 DIAGNOSIS — C61 PROSTATE CANCER (HCC): ICD-10-CM

## 2023-08-21 LAB — PSA SERPL-MCNC: 3.27 NG/ML (ref 0–4)

## 2023-08-23 ENCOUNTER — HOSPITAL ENCOUNTER (OUTPATIENT)
Dept: RADIATION ONCOLOGY | Age: 73
Discharge: HOME | End: 2023-08-23
Payer: MEDICARE

## 2023-08-23 VITALS
DIASTOLIC BLOOD PRESSURE: 86 MMHG | RESPIRATION RATE: 18 BRPM | HEART RATE: 70 BPM | BODY MASS INDEX: 28.77 KG/M2 | WEIGHT: 189.2 LBS | SYSTOLIC BLOOD PRESSURE: 167 MMHG | OXYGEN SATURATION: 99 % | TEMPERATURE: 97.5 F

## 2023-08-23 DIAGNOSIS — R97.20 ELEVATED PSA: ICD-10-CM

## 2023-08-23 DIAGNOSIS — C61 PROSTATE CANCER (HCC): Primary | ICD-10-CM

## 2023-08-23 PROCEDURE — 99213 OFFICE O/P EST LOW 20 MIN: CPT | Performed by: RADIOLOGY

## 2023-08-23 PROCEDURE — 99212 OFFICE O/P EST SF 10 MIN: CPT | Performed by: RADIOLOGY

## 2023-09-05 NOTE — PROGRESS NOTES
NURSING ASSESSMENT     Date: 8/23/2023        Patient Name: Nelida Arreguin     YOB: 1950      Age:  72 y.o. MRN: [id number]       Chaperone [] Yes   [x] No      Advance Directives:   Do you currently have completed advance directives (living will)? [x] Yes   [] No         *If yes, please bring us a copy for your records. *If no, would you like info or assistance in completing advance directives (living will)? [] Yes   [x] No    Pain Score:   Pain Score (1-10): none  Pain Location: joints on occasion  Pain Duration: intermittent  Pain Management/Control: none      Is pain affecting your ability to take care of yourself or move throughout your home? [] Yes   [x] No    General: No Problems, less energy than he used to have  Patient has gained weight [x] Yes   [] No  Patient has lost weight [] Yes   [x] No  How much weight in pounds and over what length of time: 9 lb weight gain, pt states he believes the gain is from drinking beer daily    Eyes (Ophthalmic): No Problem, wears corrective lenses     Skin (Dermatological): No Problems     ENT: No Problems     Respiratory: No Problems     Cardiovascular: No Problems      Device   [] Yes   [x] No   Copy of Card Obtained [] Yes   [x] No    Gastrointestinal: No Problems    Genito-Urinary: IPSS-5     Breast: No Problems     Musculoskeletal: Joint Pain and back pain on occasion    Neurological: No Problems      Hematological and Lymphatic: Easy Bruising, on effient     Endocrine: No Problems     A 10-point review of systems  has been conducted and pertinent positives have been   recorded. All other review of systems are negative    Was the patient admitted during the course of treatment OR within 30 days of treatment?  no    Additional Comments: pt here for PSA results
follow-up on 11/29/2023.     Isabella Rizo MD PhD  Radiation Oncologist, [address] [place] [address]

## 2023-10-30 ENCOUNTER — OFFICE VISIT (OUTPATIENT)
Dept: FAMILY MEDICINE CLINIC | Age: 73
End: 2023-10-30
Payer: MEDICARE

## 2023-10-30 VITALS
WEIGHT: 189 LBS | HEIGHT: 68 IN | DIASTOLIC BLOOD PRESSURE: 70 MMHG | OXYGEN SATURATION: 96 % | RESPIRATION RATE: 14 BRPM | HEART RATE: 88 BPM | SYSTOLIC BLOOD PRESSURE: 126 MMHG | BODY MASS INDEX: 28.64 KG/M2

## 2023-10-30 DIAGNOSIS — I10 ESSENTIAL HYPERTENSION: Primary | ICD-10-CM

## 2023-10-30 DIAGNOSIS — Z85.46 HISTORY OF PROSTATE CANCER: ICD-10-CM

## 2023-10-30 DIAGNOSIS — I25.119 ATHEROSCLEROSIS OF NATIVE CORONARY ARTERY OF NATIVE HEART WITH ANGINA PECTORIS (HCC): ICD-10-CM

## 2023-10-30 DIAGNOSIS — Z12.11 SCREEN FOR COLON CANCER: ICD-10-CM

## 2023-10-30 DIAGNOSIS — I20.9 ANGINA PECTORIS, UNSPECIFIED (HCC): ICD-10-CM

## 2023-10-30 PROCEDURE — G8484 FLU IMMUNIZE NO ADMIN: HCPCS | Performed by: INTERNAL MEDICINE

## 2023-10-30 PROCEDURE — G8427 DOCREV CUR MEDS BY ELIG CLIN: HCPCS | Performed by: INTERNAL MEDICINE

## 2023-10-30 PROCEDURE — G8417 CALC BMI ABV UP PARAM F/U: HCPCS | Performed by: INTERNAL MEDICINE

## 2023-10-30 PROCEDURE — 1123F ACP DISCUSS/DSCN MKR DOCD: CPT | Performed by: INTERNAL MEDICINE

## 2023-10-30 PROCEDURE — 3074F SYST BP LT 130 MM HG: CPT | Performed by: INTERNAL MEDICINE

## 2023-10-30 PROCEDURE — 99214 OFFICE O/P EST MOD 30 MIN: CPT | Performed by: INTERNAL MEDICINE

## 2023-10-30 PROCEDURE — 1036F TOBACCO NON-USER: CPT | Performed by: INTERNAL MEDICINE

## 2023-10-30 PROCEDURE — 3017F COLORECTAL CA SCREEN DOC REV: CPT | Performed by: INTERNAL MEDICINE

## 2023-10-30 PROCEDURE — 3078F DIAST BP <80 MM HG: CPT | Performed by: INTERNAL MEDICINE

## 2023-10-30 RX ORDER — ATORVASTATIN CALCIUM 40 MG/1
40 TABLET, FILM COATED ORAL DAILY
COMMUNITY
Start: 2023-09-15

## 2023-10-30 RX ORDER — GLUCOSAMINE/METHYLSULFONYLMETH 500-400 MG
1 CAPSULE ORAL DAILY
COMMUNITY
Start: 2023-07-27

## 2023-10-30 ASSESSMENT — ENCOUNTER SYMPTOMS
CHEST TIGHTNESS: 0
NAUSEA: 0
DIARRHEA: 0
WHEEZING: 0
EYE PAIN: 0
FACIAL SWELLING: 0
EYE REDNESS: 0
RHINORRHEA: 0
BACK PAIN: 0
SINUS PRESSURE: 0
ABDOMINAL PAIN: 0
CONSTIPATION: 0
SORE THROAT: 0
BLOOD IN STOOL: 0
COLOR CHANGE: 0
EYE ITCHING: 0
VOMITING: 0
SINUS PAIN: 0
PHOTOPHOBIA: 0
SHORTNESS OF BREATH: 0
APNEA: 0
TROUBLE SWALLOWING: 0
VOICE CHANGE: 0
RECTAL PAIN: 0
ABDOMINAL DISTENTION: 0
COUGH: 0
EYE DISCHARGE: 0

## 2023-10-30 NOTE — PROGRESS NOTES
to:  Screen for lung cancer with low-dose computed tomography (LDCT) every year. Stop screening once a person has not smoked for 15 years or has a health problem that limits life expectancy or the ability to have lung surgery. The patient  reports that he quit smoking about 9 years ago. His smoking use included cigarettes. He has a 80.00 pack-year smoking history. He has never used smokeless tobacco.. Discussed with patient the risks and benefits of screening, including over-diagnosis, false positive rate, and total radiation exposure. The patient currently exhibits no signs or symptoms suggestive of lung cancer. Discussed with patient the importance of compliance with yearly annual lung cancer screenings and willingness to undergo diagnosis and treatment if screening scan is positive. In addition, the patient was counseled regarding the importance of remaining smoke free and/or total smoking cessation.     Also reviewed the following if the patient has Medicare that as of February 10, 2022, Medicare only covers LDCT screening in patients aged 53-69 with at least a 20 pack-year smoking history who currently smoke or have quit in the last 15 years

## 2023-11-07 DIAGNOSIS — I25.119 ATHEROSCLEROSIS OF NATIVE CORONARY ARTERY OF NATIVE HEART WITH ANGINA PECTORIS (HCC): ICD-10-CM

## 2023-11-07 DIAGNOSIS — I10 ESSENTIAL HYPERTENSION: ICD-10-CM

## 2023-11-07 LAB
ALBUMIN SERPL-MCNC: 4.4 G/DL (ref 3.5–4.6)
ALP SERPL-CCNC: 95 U/L (ref 35–104)
ALT SERPL-CCNC: 21 U/L (ref 0–41)
ANION GAP SERPL CALCULATED.3IONS-SCNC: 9 MEQ/L (ref 9–15)
AST SERPL-CCNC: 21 U/L (ref 0–40)
BASOPHILS # BLD: 0 K/UL (ref 0–0.2)
BASOPHILS NFR BLD: 0.6 %
BILIRUB SERPL-MCNC: 0.7 MG/DL (ref 0.2–0.7)
BUN SERPL-MCNC: 22 MG/DL (ref 8–23)
CALCIUM SERPL-MCNC: 9.1 MG/DL (ref 8.5–9.9)
CHLORIDE SERPL-SCNC: 106 MEQ/L (ref 95–107)
CHOLEST SERPL-MCNC: 100 MG/DL (ref 0–199)
CO2 SERPL-SCNC: 26 MEQ/L (ref 20–31)
CREAT SERPL-MCNC: 0.9 MG/DL (ref 0.7–1.2)
EOSINOPHIL # BLD: 0.2 K/UL (ref 0–0.7)
EOSINOPHIL NFR BLD: 2.6 %
ERYTHROCYTE [DISTWIDTH] IN BLOOD BY AUTOMATED COUNT: 12 % (ref 11.5–14.5)
GLOBULIN SER CALC-MCNC: 2.8 G/DL (ref 2.3–3.5)
GLUCOSE SERPL-MCNC: 97 MG/DL (ref 70–99)
HCT VFR BLD AUTO: 46.5 % (ref 42–52)
HDLC SERPL-MCNC: 43 MG/DL (ref 40–59)
HGB BLD-MCNC: 15.6 G/DL (ref 14–18)
LDLC SERPL CALC-MCNC: 43 MG/DL (ref 0–129)
LYMPHOCYTES # BLD: 1.3 K/UL (ref 1–4.8)
LYMPHOCYTES NFR BLD: 20.1 %
MCH RBC QN AUTO: 32.6 PG (ref 27–31.3)
MCHC RBC AUTO-ENTMCNC: 33.5 % (ref 33–37)
MCV RBC AUTO: 97.3 FL (ref 79–92.2)
MONOCYTES # BLD: 0.5 K/UL (ref 0.2–0.8)
MONOCYTES NFR BLD: 7 %
NEUTROPHILS # BLD: 4.6 K/UL (ref 1.4–6.5)
NEUTS SEG NFR BLD: 69.4 %
PLATELET # BLD AUTO: 216 K/UL (ref 130–400)
POTASSIUM SERPL-SCNC: 4.2 MEQ/L (ref 3.4–4.9)
PROT SERPL-MCNC: 7.2 G/DL (ref 6.3–8)
RBC # BLD AUTO: 4.78 M/UL (ref 4.7–6.1)
SODIUM SERPL-SCNC: 141 MEQ/L (ref 135–144)
TRIGL SERPL-MCNC: 68 MG/DL (ref 0–150)
WBC # BLD AUTO: 6.6 K/UL (ref 4.8–10.8)

## 2023-11-20 DIAGNOSIS — C61 PROSTATE CANCER (HCC): ICD-10-CM

## 2023-11-20 DIAGNOSIS — R97.20 ELEVATED PSA: ICD-10-CM

## 2023-11-20 LAB — PSA SERPL-MCNC: 4.4 NG/ML (ref 0–4)

## 2023-11-29 ENCOUNTER — HOSPITAL ENCOUNTER (OUTPATIENT)
Dept: RADIATION ONCOLOGY | Age: 73
Discharge: HOME | End: 2023-11-29
Payer: MEDICARE

## 2023-11-29 VITALS
HEART RATE: 75 BPM | SYSTOLIC BLOOD PRESSURE: 181 MMHG | TEMPERATURE: 97.6 F | WEIGHT: 187.6 LBS | OXYGEN SATURATION: 98 % | RESPIRATION RATE: 18 BRPM | BODY MASS INDEX: 28.52 KG/M2 | DIASTOLIC BLOOD PRESSURE: 88 MMHG

## 2023-11-29 DIAGNOSIS — C61 PROSTATE CANCER (HCC): Primary | ICD-10-CM

## 2023-11-29 DIAGNOSIS — R97.20 ELEVATED PSA: ICD-10-CM

## 2023-11-29 PROCEDURE — 99214 OFFICE O/P EST MOD 30 MIN: CPT | Performed by: RADIOLOGY

## 2023-11-29 PROCEDURE — 99212 OFFICE O/P EST SF 10 MIN: CPT | Performed by: RADIOLOGY

## 2023-11-29 NOTE — PROGRESS NOTES
NURSING ASSESSMENT     Date: 11/29/2023        Patient Name: Bharath Rosales     YOB: 1950      Age:  72 y.o. MRN: [id number]       Chaperone [] Yes   [x] No      Advance Directives:   Do you currently have completed advance directives (living will)? [x] Yes   [] No         *If yes, please bring us a copy for your records. *If no, would you like info or assistance in completing advance directives (living will)? [] Yes   [x] No    Pain Score:   Pain Score (1-10): Denies   Pain Location: NA   Pain Duration: NA   Pain Management/Control: NA      Is pain affecting your ability to take care of yourself or move throughout your home? [] Yes   [x] No    General:  a little more tired than he used to be  Patient has gained weight [] Yes   [x] No  Patient has lost weight [] Yes   [x] No  How much weight in pounds and over what length of time:     Eyes (Ophthalmic): Wears glasses     Skin (Dermatological): No Problems     ENT: Chronic runny nose     Respiratory: HANSON at times with walking     Cardiovascular: No Problems- Stress test completed yesterday 11/28/23      Device   [] Yes   [x] No   Copy of Card Obtained [] Yes   [x] No    Gastrointestinal: No Problems    Genito-Urinary: No changes since last visit in August 2023       Breast: No Problems     Musculoskeletal: Joint Pain-Knees and hips    Neurological: No Problems      Hematological and Lymphatic: Easy Bruising-takes effient     Endocrine: No Problems         A 10-point review of systems  has been conducted and pertinent positives have been   recorded. All other review of systems are negative    Was the patient admitted during the course of treatment OR within 30 days of treatment?  No      Additional Comments: F/U with Dr. Helio Carver tomorrow 11/30/23

## 2023-11-30 ENCOUNTER — OFFICE VISIT (OUTPATIENT)
Dept: UROLOGY | Facility: CLINIC | Age: 73
End: 2023-11-30
Payer: MEDICARE

## 2023-11-30 VITALS
BODY MASS INDEX: 28.79 KG/M2 | DIASTOLIC BLOOD PRESSURE: 81 MMHG | HEIGHT: 68 IN | HEART RATE: 75 BPM | TEMPERATURE: 97.9 F | WEIGHT: 190 LBS | SYSTOLIC BLOOD PRESSURE: 126 MMHG

## 2023-11-30 DIAGNOSIS — C61 PROSTATE CANCER (MULTI): Primary | ICD-10-CM

## 2023-11-30 PROCEDURE — 1159F MED LIST DOCD IN RCRD: CPT | Performed by: UROLOGY

## 2023-11-30 PROCEDURE — 99213 OFFICE O/P EST LOW 20 MIN: CPT | Performed by: UROLOGY

## 2023-11-30 RX ORDER — MULTIVIT WITH CALCIUM,IRON,MIN 18MG-0.4MG
500 TABLET ORAL DAILY
COMMUNITY

## 2023-11-30 RX ORDER — NITROGLYCERIN 0.4 MG/1
0.4 TABLET SUBLINGUAL DAILY
COMMUNITY
Start: 2019-03-12

## 2023-11-30 RX ORDER — ASPIRIN 81 MG/1
1 TABLET ORAL DAILY
COMMUNITY
Start: 2017-03-08

## 2023-11-30 RX ORDER — METOPROLOL SUCCINATE 25 MG/1
25 TABLET, EXTENDED RELEASE ORAL DAILY
COMMUNITY
Start: 2019-03-12

## 2023-11-30 RX ORDER — LISINOPRIL 2.5 MG/1
2.5 TABLET ORAL DAILY
COMMUNITY
Start: 2019-11-19

## 2023-11-30 RX ORDER — PRASUGREL 10 MG/1
1 TABLET, FILM COATED ORAL DAILY
COMMUNITY
Start: 2019-03-12

## 2023-11-30 RX ORDER — NIACIN (INOSITOL NIACINATE) 400(500MG)
1 CAPSULE ORAL DAILY
COMMUNITY

## 2023-11-30 RX ORDER — ATORVASTATIN CALCIUM 40 MG/1
40 TABLET, FILM COATED ORAL DAILY
COMMUNITY
Start: 2023-09-15

## 2023-11-30 NOTE — PROGRESS NOTES
Subjective   Patient ID: Art Daniel is a 73 y.o. male who presents for PSA results. Last seen 8/29/23 when We discussed the PSMA PET scan results, the subsequent biopsy and PSA trending downward. There is no definitive evidence of recurrent cancer and I recommend we continue with active surveillance. We will see him back with PSA in 3 months.     HPI  Most recent PSA is 4.40, up from 3.27 in August 2023. The patient thinks that Dr. Almanzar has an MRI ordered to prepare for a fusion biopsy. Patient denies hematuria, dysuria, urgency, and frequency. It is the patient's understanding that even if the biopsy is negative that he will still be treated for cancer. A biopsy is not currently scheduled but will be done within the month by Dr. Almanzar.     Review of Systems  A 12 system review was completed and is negative with the exception of those signs and symptoms noted in the history of present illness.    Objective   Physical Exam  General: in NAD, appears stated age  Head: normocephalic, atraumatic  Respiratory: normal effort, no use of accessory muscles  Cardiovascular: no edema noted  Skin: normal turgor, no rashes  Neurologic: grossly intact, oriented to person/place/time  Psychiatric: mode and affect appropriate     Assessment/Plan   Problem List Items Addressed This Visit    None    We reviewed the PSA results. Post brachytherapy I would hope that his PSA values were lower. I would recommend a biopsy even if the MRI is negative. I do not recommend prophylactic antibiotic for possible prostatitis in the absence of symptoms. Patient will have MRI and fusion biopsy with Dr. Almanzar. I would like to follow up in 3 months (or 3 months after adjuvant radiation if that is undertaken).    All questions and concerns were addressed. Patient verbalizes understanding and has no other questions at this time.      Scribe Attestation  By signing my name below, I, Miranda Loyola , Angel   attest that this documentation has been prepared  under the direction and in the presence of Butch Linares MD.

## 2023-12-05 ENCOUNTER — PATIENT MESSAGE (OUTPATIENT)
Dept: UROLOGY | Facility: CLINIC | Age: 73
End: 2023-12-05
Payer: MEDICARE

## 2023-12-05 DIAGNOSIS — N41.0 ACUTE PROSTATITIS: Primary | ICD-10-CM

## 2023-12-05 RX ORDER — SULFAMETHOXAZOLE AND TRIMETHOPRIM 800; 160 MG/1; MG/1
1 TABLET ORAL 2 TIMES DAILY
Qty: 60 TABLET | Refills: 0 | Status: SHIPPED | OUTPATIENT
Start: 2023-12-05 | End: 2024-01-04

## 2023-12-05 NOTE — TELEPHONE ENCOUNTER
From: Claudia Daniel  To: Butch Linares MD  Sent: 12/5/2023 10:15 AM EST  Subject: Impact of Sulfameth Intervention on my PSA Levels    Dr. TUTTLE,  I'm not sure if you're aware of Sulfameth intervention immediately before my PSA decline last spring and summer. I don't recall discussing it with you. The Details: At the end of last March I began a 30 day Sulfameth regimen. After one week it was aborted due to bad reaction. The next month my PSA began to fall (you have graph). Tho far from conclusive, suggests PSA responsive to antibiotic. This is chief reason I still think best to perhaps see if antibiotics (especially a full course) would impact PSA again while preparing for biopsy in the coming month. To repeat what I said last Thurs, I don't see this as either/or but, rather, belt & suspenders approach and I do want to get this biopsy done to see what it tells us.  So if you did not know this, does it change your thinking or, at least, make my thinking seem a bit less crazy or irresponsible to you? And, btw, (should you reconsider), I've reviewed my records from 2018 and it was Sulfameth not Cipro that I had bad reaction to then also. Dr. Martin had first prescribed Cipro, but then switched it to Sulfameth.  Sorry for long message; thanks for your patience reviewing it; please let me know. ----claudia daniel

## 2023-12-08 DIAGNOSIS — N41.0 ACUTE PROSTATITIS: Primary | ICD-10-CM

## 2023-12-08 RX ORDER — CIPROFLOXACIN 500 MG/1
500 TABLET ORAL 2 TIMES DAILY
Qty: 60 TABLET | Refills: 0 | Status: SHIPPED | OUTPATIENT
Start: 2023-12-08 | End: 2024-01-07

## 2024-01-03 ENCOUNTER — HOSPITAL ENCOUNTER (OUTPATIENT)
Dept: MRI IMAGING | Age: 74
Discharge: HOME | End: 2024-01-05
Payer: MEDICARE

## 2024-01-03 DIAGNOSIS — R97.20 ELEVATED PSA: ICD-10-CM

## 2024-01-03 DIAGNOSIS — C61 PROSTATE CANCER (HCC): ICD-10-CM

## 2024-01-03 PROCEDURE — A9577 INJ MULTIHANCE: HCPCS | Performed by: RADIOLOGY

## 2024-01-03 PROCEDURE — 72197 MRI PELVIS W/O & W/DYE: CPT

## 2024-01-03 PROCEDURE — 6360000004 HC RX CONTRAST MEDICATION: Performed by: RADIOLOGY

## 2024-01-03 RX ADMIN — GADOBENATE DIMEGLUMINE 20 ML: 529 INJECTION, SOLUTION INTRAVENOUS at 14:28

## 2024-01-09 ENCOUNTER — TELEPHONE (OUTPATIENT)
Dept: UROLOGY | Facility: CLINIC | Age: 74
End: 2024-01-09
Payer: MEDICARE

## 2024-01-09 DIAGNOSIS — C61 PROSTATE CANCER (MULTI): Primary | ICD-10-CM

## 2024-01-09 NOTE — TELEPHONE ENCOUNTER
Please see message below    Will finish Cipro this weekend  ---  no adverse reactions.  Am getting MRI as 1st step for Dr BACA's fusion biopsy tomorrow (Wed 1/3).  Need order for PSA test before biopsy to avoid trauma bias.  I anticipate/hope for biopsy next week.  Can you put in order for PSA test ASAP?  Since I do not wish to delay biopsy, I'd like to go forward w/ PSA early next week.  Where do I get my blood drawn?  Please advise.  Clayton,   ----claudia barajas

## 2024-01-11 ENCOUNTER — HOSPITAL ENCOUNTER (OUTPATIENT)
Dept: RADIATION ONCOLOGY | Age: 74
Discharge: HOME | End: 2024-01-11

## 2024-01-11 NOTE — PROGRESS NOTES
RADIATION ONCOLOGY  Prostate Biopsy Procedure  Patient Education      If on Anti-Coagulants, discontinue them 7 days prior to the procedure and may be resumed 48 hours after the completion of the procedure provided you do not notice any active bleeding in urine or stool.    Over the counter NSAIDS taken on occasion (like naproxen, advil, etc) should also not be taken within this time frame (1 week) and you may resume taking 2 days after provided that you do not notice any active bleeding in urine or stool.     You are to complete 1 Fleets Enemas the evening before the procedure and 1 Fleets enema the morning of the procedure.    You are to take prescribed Percocet and Ativan one hour before the procedure and one dose of prescribed antibiotic two hours before procedure.    You may have a light breakfast the morning of the procedure.    You are to have someone drive you to and from the [facility].    Nursing will call you before the procedure to verify that all instructions were followed.    You will have a CT scan after the procedure.    Before leaving the [facility], you will need to urinate.    Your physician will contact you regarding the results of your biopsy.    [x] Instructions provided to patient and patient verbalized understanding.     Message left with Victorina at cardiologist Dr.Mark Baptiste at [facility] to request clearance for patient to hold effient and baby aspirin 7 days prior to procedure and 48 hrs post procedure. Contact number provided to Victorina for return response.

## 2024-01-16 ENCOUNTER — LAB (OUTPATIENT)
Dept: LAB | Facility: LAB | Age: 74
End: 2024-01-16
Payer: MEDICARE

## 2024-01-16 DIAGNOSIS — C61 PROSTATE CANCER (MULTI): ICD-10-CM

## 2024-01-16 LAB — PSA SERPL-MCNC: 4.71 NG/ML

## 2024-01-16 PROCEDURE — 84153 ASSAY OF PSA TOTAL: CPT

## 2024-01-16 PROCEDURE — 36415 COLL VENOUS BLD VENIPUNCTURE: CPT

## 2024-01-24 RX ORDER — LEVOFLOXACIN 500 MG/1
500 TABLET, FILM COATED ORAL ONCE
Qty: 1 TABLET | Refills: 0 | Status: SHIPPED | OUTPATIENT
Start: 2024-01-24 | End: 2024-01-24

## 2024-01-24 RX ORDER — LORAZEPAM 1 MG/1
1 TABLET ORAL ONCE
Qty: 1 TABLET | Refills: 0 | Status: SHIPPED | OUTPATIENT
Start: 2024-01-24 | End: 2024-01-24

## 2024-01-24 RX ORDER — OXYCODONE AND ACETAMINOPHEN 10; 325 MG/1; MG/1
1 TABLET ORAL ONCE
Qty: 1 TABLET | Refills: 0 | Status: SHIPPED | OUTPATIENT
Start: 2024-01-24 | End: 2024-01-24

## 2024-01-26 ENCOUNTER — HOSPITAL ENCOUNTER (OUTPATIENT)
Dept: RADIATION ONCOLOGY | Age: 74
Discharge: HOME | End: 2024-01-26

## 2024-01-26 ENCOUNTER — HOSPITAL ENCOUNTER (OUTPATIENT)
Dept: RADIATION ONCOLOGY | Age: 74
End: 2024-01-26
Payer: MEDICARE

## 2024-01-26 DIAGNOSIS — C61 PROSTATE CANCER (HCC): Primary | ICD-10-CM

## 2024-01-26 DIAGNOSIS — R97.20 ELEVATED PSA: ICD-10-CM

## 2024-01-26 RX ORDER — LEVOFLOXACIN 250 MG/1
250 TABLET, FILM COATED ORAL 2 TIMES DAILY WITH MEALS
Qty: 6 TABLET | Refills: 0 | Status: SHIPPED | OUTPATIENT
Start: 2024-01-26 | End: 2024-01-29

## 2024-01-26 NOTE — PROCEDURES
[facility]           Radiation Oncology      [address], [place] [address]        O: [phone]        F: [phone]       [facility]                   Dr. Ruben Parisi MD PhD    PROCEDURE NOTE     Date of Service: 2024  Patient ID: Radu Suarez   : 1950  MRN: [id number]   Acct Number: [id number]     Radu Suarez  73 y.o.   1950    REFERRING PROVIDER: Raul    PCP:  Matheus Nunez MD    DIAGNOSIS: No diagnosis found.    STAGING: Cancer Staging   Prostate cancer (HCC)  Staging form: Prostate, AJCC 8th Edition  - Clinical stage from 2019: Stage I (cT2a, cN0, cM0, PSA: 7.4, Grade Group: 1) - Signed by Judd Winslow MD on 2019      PROCEDURE: The patient was placed in the dorsal lithotomy position on the examination table. Genitalia were mobilized out of the perineal field. The perineum was then sterilized with Betadine. The perineum was anesthetized with 1% lidocaine for injection with epinephrine injecting a total of 5 cc of a solution within the superficial subcutaneous tissues of the perineum to help create a superficial block.  I then placed the BK rectal probe in position so the prostate was easily visualized, and once the prostate was in place and midline, I locked the [employer] ultrasound probe into the stepper unit.  A prostate apical block was then performed using the remaining 1% lidocaine with epinephrine injecting an additional 5 to 6 cc into the prostate apex along the expected needle tracks and near the pudendal nerves.       Using the [employer] ultrasound system, the ultrasound image was registered in real time with the patients MRI prostate. The dominant lesions were localized and biopsies were taken as follows: 3 left seminal vesicle (dominant lesion), 1 left apex, 1 right base (dominant lesion), 1 right apex, for a total of 6 cores. Approximately 50 cc of blood loss was noted. Once hemostasis was noted the patient was cleaned and

## 2024-01-26 NOTE — PROGRESS NOTES
[facility]                                                           Radiation Oncology                                            Prostate MRI Fusion Biopsy    TIME OUT: 1:42 pm  [x] Verified Patient Name and Date of Birth Radu Suarez 1950  [x] Verified Consent form was completed  [x] Verified procedure and procedure site- prostate biopsy      MEDICATIONS FOR PHYSICIAN TO ADMINISTER    [x] Lidocaine 2% Jelly X 1 syringe  [x] Lidocaine 2% with Epinephrine 20 ML in 2(two) 10 ML Syringes  [x] Normal Saline 10 ML      Procedure  [x] Supplies and room set up per SOP  Procedure start time: 1:43 pm  Biopsy site #1: Seminal vesicle, three cores   Biopsy site #1 time: 1) 1:59 pm, 2) 2:01 pm, 3) 2:03 pm  Biopsy site #2:Left [place]  Biopsy site # 2 time: 2:05 pm  Biopsy site #3: Rt [place]  Biopsy site #3 time: 2:14 pm  Biopsy site #4: Rt [place]  Biopsy site #4 time: 2:17 pm    Procedure Completion time: 2:21 pm    Vital Signs Pre-Procedure: 97.5 temporal, 18 respirations, 70 HR, 135/80, 96% room air    Vital Signs Post-Procedure: 97.5 temporal, 16 respirations, 83 HR, 128/78, 98% room air    PT tolerated the procedure:  [x]Well  [] Other:      Discharge:  [x] PT aware that they are to call Radiation Oncology and / Or their Urologist for blood in their urine or difficulty urinating.  [x] PT to avoid any heavy lifting for the next 48 hours.  [x] PT may resume blood thinners in 48 hours if no blood in urine or stool.  [x] PT to call Radiation Oncology or their urologist for increased pain or fever greater than 100.5.  [x] PT driven home by responsible adult.

## 2024-02-28 ENCOUNTER — HOSPITAL ENCOUNTER (OUTPATIENT)
Dept: RADIATION ONCOLOGY | Age: 74
Discharge: HOME | End: 2024-02-28
Payer: MEDICARE

## 2024-02-28 VITALS
SYSTOLIC BLOOD PRESSURE: 156 MMHG | WEIGHT: 191.2 LBS | HEART RATE: 61 BPM | DIASTOLIC BLOOD PRESSURE: 73 MMHG | TEMPERATURE: 97.7 F | RESPIRATION RATE: 18 BRPM | BODY MASS INDEX: 29.07 KG/M2 | OXYGEN SATURATION: 97 %

## 2024-02-28 DIAGNOSIS — R97.20 ELEVATED PSA: ICD-10-CM

## 2024-02-28 DIAGNOSIS — C61 PROSTATE CANCER (HCC): Primary | ICD-10-CM

## 2024-02-28 PROCEDURE — 99213 OFFICE O/P EST LOW 20 MIN: CPT | Performed by: RADIOLOGY

## 2024-02-28 NOTE — PROGRESS NOTES
RADIATION ONCOLOGY  SpaceOar Gel & Fiducial Marker Procedure  Patient Education      If on Anti-Coagulants, discontinue them 7 days prior to the procedure and may be resumed 48 hours after the completion of the procedure provided you do not notice any active bleeding in urine or stool.    Over the counter NSAIDS taken on occasion (like naproxen, advil, etc) should also not be taken within this time frame (1 week) and you may resume taking 2 days after provided that you do not notice any active bleeding in urine or stool.     You are to complete 1 Fleets Enemas the evening before the procedure and 1 Fleets enema the morning of the procedure.    You are to take prescribed Percocet and Ativan one hour before the procedure and one dose of  prescribed antibiotic two hours before procedure.    You may have a light breakfast the morning of the procedure.    You are to have someone drive you to and from the [facility].    Nursing will call you before the procedure to verify that all instructions were followed.    You will have a CT scan after the procedure.    You will be scheduled for CT Simulation approximately 10 days after the SpacerOar Gel and Fiducial Marker procedure.     Before leaving the [facility], you will need to urinate.    [x] Instructions provided to patient and patient verbalized understanding.    Patient takes Effient and ASA 81 mg. Will call cardiologist Dr. Jose Baptiste at [facility] to inform and get approval to hold these meds for 7 days before and 2 days after procedure.     11:55 am- Triage nurse Uzma from Dr. Jose Baptiste's office called back to inform that the doctor has approved holding the Effient and ASA 81 mg 7 days before procedure and may resume 2 days after procedure.

## 2024-02-29 RX ORDER — OXYCODONE AND ACETAMINOPHEN 10; 325 MG/1; MG/1
1 TABLET ORAL ONCE
Qty: 1 TABLET | Refills: 0 | Status: SHIPPED | OUTPATIENT
Start: 2024-02-29 | End: 2024-02-29

## 2024-02-29 RX ORDER — LORAZEPAM 1 MG/1
1 TABLET ORAL ONCE
Qty: 1 TABLET | Refills: 0 | Status: SHIPPED | OUTPATIENT
Start: 2024-02-29 | End: 2024-02-29

## 2024-02-29 RX ORDER — LEVOFLOXACIN 500 MG/1
500 TABLET, FILM COATED ORAL ONCE
Qty: 1 TABLET | Refills: 0 | Status: SHIPPED | OUTPATIENT
Start: 2024-02-29 | End: 2024-02-29

## 2024-02-29 NOTE — PROGRESS NOTES
[facility]           Radiation Oncology      [id number] [place], [place] [address]        O: [phone]        F: [phone]       [facility]                   Dr. Ruben Parisi MD PhD    FOLLOW-UP NOTE     Date of Service: 2024  Patient ID: Radu Suarez   : 1950  MRN: [id number]   Acct Number: [id number]       NAME:  Radu Suarez    :  1950 73 y.o. male     PCP: Matheus Nunez MD    REFERRING PROVIDER: Raul    DIAGNOSIS:  1. Prostate cancer (HCC)    2. Elevated PSA        STAGING: Cancer Staging   Prostate cancer (HCC)  Staging form: Prostate, AJCC 8th Edition  - Clinical stage from 2019: Stage I (cT2a, cN0, cM0, PSA: 7.4, Grade Group: 1) - Signed by Judd Winslow MD on 2019      PRIOR TREATMENT: Transperineal ultrasound guided prostate brachytherapy, Iodine 125, 145Gy, implanted 65 seeds using 20 needles.  Implant date 20. SpaceOAR gel placed 20.     TIME SINCE TREATMENT: Approximately 37 months     RECENT HISTORY: Radu Suarez returns for follow up status post completion of definitive radiation therapy with iodine 125 low-dose rate intraprostatic brachytherapy permanent implant.  His most recent PSA was 4.71, up from prior values of 4.40 and 3.27.  He did have an MRI guided fusion biopsy last month which revealed residual viable prostate adenocarcinoma and a targeted lesion within the left seminal vesicle.  He tolerated this therapy well and has no adverse sequelae from this.  He returns to discuss the next steps of management of his recurrent prostate cancer.      Past medical, surgical, social and family histories reviewed and updated as indicated.    ALLERGIES:  Ciprofloxacin, Penicillins, and Sulfamethoxazole-trimethoprim       MEDICATIONS:   Current Outpatient Medications:     atorvastatin (LIPITOR) 40 MG tablet, Take 1 tablet by mouth daily, Disp: , Rfl:     Glucosamine-Chondroitin--250-250 MG CAPS, Take 1 capsule

## 2024-03-08 ENCOUNTER — HOSPITAL ENCOUNTER (OUTPATIENT)
Dept: RADIATION ONCOLOGY | Age: 74
Discharge: HOME | End: 2024-03-08
Payer: MEDICARE

## 2024-03-08 DIAGNOSIS — R97.20 ELEVATED PSA: ICD-10-CM

## 2024-03-08 DIAGNOSIS — C61 PROSTATE CANCER (HCC): Primary | ICD-10-CM

## 2024-03-08 PROCEDURE — 55874 TPRNL PLMT BIODEGRDABL MATRL: CPT | Performed by: RADIOLOGY

## 2024-03-08 PROCEDURE — C1889 IMPLANT/INSERT DEVICE, NOC: HCPCS

## 2024-03-08 NOTE — PROGRESS NOTES
[facility]                                                           Radiation Oncology                                            Fiducial SpaceOar   MEDICATIONS FOR PHYSICIAN TO ADMINISTER    [x] Lidocaine 2% Jelly X 1 syringe  [x] Lidocaine 2% with Epinephrine 20 ML in 2(two) 10 ML Syringes  [] Normal Saline (not required for Barrigel)  [] SpaceOar or  [x] Barrigel      Procedure   Time out: 1032 am  [x] Supplies and room set up per SOP  Procedure start time: 1034 am  Barrigel insertion start time: 1055 am   Barrigel insertion end time: 1122 am    Procedure completion time: 1123 am    Vital Signs Pre-Procedure: 96.8, 76 HR, 151/74, 18, 98% RA    Vital Signs Post-Procedure:96.9, 85, 18, 122/69, 99% RA    PT tolerated the procedure:  [x]Well  [] Other:    Post Procedure:  [x] Patient voided prior to discharge, denied dysuria or hematuria.    Discharge:  [x] PT aware that they are to call Radiation Oncology and / Or their Urologist for blood in their urine or difficulty urinating.  [x] PT to avoid any heavy lifting for the next 48 hours.  [x] PT may resume blood thinners in 48 hours if no blood in urine or stool.  [x] PT to call Radiation Oncology or their urologist for increased pain or fever greater than 100.5.  [x] PT driven home by responsible adult.

## 2024-03-08 NOTE — PROCEDURES
[facility]           Radiation Oncology      [address] [place] [address]        O: [phone]        F: [phone]       [facility]                   Dr. Ruben Parisi MD PhD    PROCEDURE NOTE     Date of Service: 3/8/2024  Patient ID: Radu Suarez   : 1950  MRN: [id number]   Acct Number: [id number]     Radu Suarez  73 y.o.   1950    REFERRING PROVIDER: Raul    PCP:  Matheus Nunez MD    DIAGNOSIS:   1. Prostate cancer (HCC)    2. Elevated PSA        STAGING: Cancer Staging   Prostate cancer (HCC)  Staging form: Prostate, AJCC 8th Edition  - Clinical stage from 2019: Stage I (cT2a, cN0, cM0, PSA: 7.4, Grade Group: 1) - Signed by Judd Winslow MD on 2019      PROCEDURE: The patient was properly identified and placed in the dorsal lithotomy position on the procedure table. The perineum was prepped in the usual sterile fashion. The transrectal ultrasound probe was positioned in the patient's rectum and the surrounding anatomy was visualized to include the entire prostate, seminal vesicles, rectum, bladder and neurovascular bundles. After the anatomy was verified and the visualization of the prostate/rectal interface was confirmed the decision was made that the needle tip would be properly visualized for proper Barrigel placement. A Biplanar ultrasound with a stepper was utilized. Local anesthetic was then placed under ultrasound visual guidance. A small skin wheal with 1% lidocaine was made 1-2 cm above the probe. Using ultrasound guidance, a 6-inch, 22-gauge spinal needle was used to administer 1-2% lidocaine to anesthetize the needle track from perineum up to the area under the prostate apex. Lidocaine was delivered to the areas of the neurovascular bundles and between the prostate/rectal interface.     Prior to beginning the Barrigel procedure, all critical anatomical structures and the target zone for implantation were identified

## 2024-03-20 ENCOUNTER — HOSPITAL ENCOUNTER (OUTPATIENT)
Dept: RADIATION ONCOLOGY | Age: 74
Discharge: HOME | End: 2024-03-20
Payer: MEDICARE

## 2024-03-20 ENCOUNTER — HOSPITAL ENCOUNTER (OUTPATIENT)
Dept: MRI IMAGING | Age: 74
Discharge: HOME | End: 2024-03-22
Payer: MEDICARE

## 2024-03-20 DIAGNOSIS — C61 PROSTATE CANCER (HCC): ICD-10-CM

## 2024-03-20 DIAGNOSIS — C61 PROSTATE CANCER (HCC): Primary | ICD-10-CM

## 2024-03-20 DIAGNOSIS — R97.20 ELEVATED PSA: ICD-10-CM

## 2024-03-20 PROCEDURE — 77334 RADIATION TREATMENT AID(S): CPT | Performed by: RADIOLOGY

## 2024-03-20 PROCEDURE — 72197 MRI PELVIS W/O & W/DYE: CPT

## 2024-03-20 PROCEDURE — 77263 THER RADIOLOGY TX PLNG CPLX: CPT | Performed by: RADIOLOGY

## 2024-03-20 PROCEDURE — 6360000004 HC RX CONTRAST MEDICATION: Performed by: RADIOLOGY

## 2024-03-20 PROCEDURE — A9577 INJ MULTIHANCE: HCPCS | Performed by: RADIOLOGY

## 2024-03-20 RX ADMIN — GADOBENATE DIMEGLUMINE 20 ML: 529 INJECTION, SOLUTION INTRAVENOUS at 15:16

## 2024-03-20 NOTE — PROGRESS NOTES
Teaching & Instructions - Prostate  General  Simulation  Initial Treatment  Self-Care Info  Nutrition  Social Service    Site-Specific  Side Effects  Cystitis Mgmt  Bowel Mgmt  Perineal Care  Proctitis Mgmt  Sexuality Issues      Educational Handouts  Radiation Therapy & You  Site Specific Instructions  Radiation Oncology Dept Information  CBMS Program      Patient eager to learn, verbalized understanding of verbal education and handouts.

## 2024-04-02 ENCOUNTER — HOSPITAL ENCOUNTER (OUTPATIENT)
Dept: RADIATION ONCOLOGY | Age: 74
Discharge: HOME | End: 2024-04-02
Payer: MEDICARE

## 2024-04-02 DIAGNOSIS — C61 PROSTATE CANCER (HCC): Primary | ICD-10-CM

## 2024-04-04 ENCOUNTER — HOSPITAL ENCOUNTER (OUTPATIENT)
Dept: RADIATION ONCOLOGY | Age: 74
Discharge: HOME | End: 2024-04-04
Payer: MEDICARE

## 2024-04-04 VITALS
RESPIRATION RATE: 18 BRPM | BODY MASS INDEX: 28.77 KG/M2 | HEART RATE: 59 BPM | SYSTOLIC BLOOD PRESSURE: 152 MMHG | WEIGHT: 189.2 LBS | TEMPERATURE: 98.1 F | DIASTOLIC BLOOD PRESSURE: 98 MMHG | OXYGEN SATURATION: 98 %

## 2024-04-04 DIAGNOSIS — C61 PROSTATE CANCER (HCC): Primary | ICD-10-CM

## 2024-04-04 PROCEDURE — 77373 STRTCTC BDY RAD THER TX DLVR: CPT | Performed by: RADIOLOGY

## 2024-04-04 NOTE — PROGRESS NOTES
[facility]           Radiation Oncology      [address] [id number]        O: [phone]        F: [phone]       [facility]                   Dr. Ruben Parisi MD PhD    ON TREATMENT VISIT (OTV) NOTE     Date of Service: 2024  Patient ID: Radu Suarez   : 1950  MRN: [id number]   Acct Number: [id number]       DIAGNOSIS:   Cancer Staging   Prostate cancer (HCC)  Staging form: Prostate, AJCC 8th Edition  - Clinical stage from 2019: Stage I (cT2a, cN0, cM0, PSA: 7.4, Grade Group: 1) - Signed by Judd Winslow MD on 2019      Treatment Area: Pelvis- Male    Current Total Dose(cGy): 1600  Current Fraction: 2    Patient was seen today for weekly visit.     Wt Readings from Last 3 Encounters:   24 85.8 kg (189 lb 3.2 oz)   24 86.7 kg (191 lb 3.2 oz)   23 85.1 kg (187 lb 9.6 oz)       BP (!) 152/98   Pulse 59   Temp 98.1 °F (36.7 °C) (Temporal)   Resp 18   Wt 85.8 kg (189 lb 3.2 oz)   SpO2 98%   BMI 28.77 kg/m²     Lab Results   Component Value Date    WBC 6.6 2023    HGB 15.6 2023    HCT 46.5 2023     2023       Comfort Alteration  Fatigue: Denies any changes to baseline energy level.    Pain Location: none  Pain Intensity (Current): n/a  Pain Treatment: n/a  Pain Relief: n/a    Emotional Alteration:   Coping: effective    Nutritional Alteration  Anorexia: none   Nausea: No nausea noted  Vomiting: No vomiting     Skin Alteration   Skin reaction: No changes noted    Elimination Alterations  Urinary Frequency: Voiding less then once an hour  Urinary Retention: Normal  Urinary Incontinence: None  Dysuria: None  Nocturia: nocturia x1  Proctitis: No changes. Occasional flare up of hemorrhoids.  Diarrhea: No change to bowel movements from baseline.  Comments:     Additional Comments:     MEDICATIONS:     Current Outpatient Medications   Medication Sig Dispense Refill    atorvastatin (LIPITOR) 40

## 2024-04-09 ENCOUNTER — HOSPITAL ENCOUNTER (OUTPATIENT)
Dept: RADIATION ONCOLOGY | Age: 74
Discharge: HOME | End: 2024-04-09
Payer: MEDICARE

## 2024-04-09 DIAGNOSIS — C61 PROSTATE CANCER (HCC): Primary | ICD-10-CM

## 2024-04-09 PROCEDURE — 77373 STRTCTC BDY RAD THER TX DLVR: CPT | Performed by: RADIOLOGY

## 2024-04-11 ENCOUNTER — HOSPITAL ENCOUNTER (OUTPATIENT)
Dept: RADIATION ONCOLOGY | Age: 74
Discharge: HOME | End: 2024-04-11
Payer: MEDICARE

## 2024-04-11 VITALS
OXYGEN SATURATION: 98 % | DIASTOLIC BLOOD PRESSURE: 96 MMHG | HEART RATE: 62 BPM | SYSTOLIC BLOOD PRESSURE: 180 MMHG | RESPIRATION RATE: 18 BRPM | WEIGHT: 191.6 LBS | TEMPERATURE: 97.3 F | BODY MASS INDEX: 29.13 KG/M2

## 2024-04-11 DIAGNOSIS — C61 PROSTATE CANCER (HCC): Primary | ICD-10-CM

## 2024-04-11 PROCEDURE — 77373 STRTCTC BDY RAD THER TX DLVR: CPT | Performed by: RADIOLOGY

## 2024-04-11 NOTE — PROGRESS NOTES
[facility]           Radiation Oncology      [address] [id number]        O: [phone]        F: [phone]       [facility]                   Dr. Ruben Parisi MD PhD    ON TREATMENT VISIT (OTV) NOTE     Date of Service: 2024  Patient ID: Radu Suarez   : 1950  MRN: [id number]   Acct Number: [id number]       DIAGNOSIS:   Cancer Staging   Prostate cancer (HCC)  Staging form: Prostate, AJCC 8th Edition  - Clinical stage from 2019: Stage I (cT2a, cN0, cM0, PSA: 7.4, Grade Group: 1) - Signed by Judd Winslow MD on 2019      Treatment Area: Pelvis- Male    Current Total Dose(cGy): 3200  Current Fraction: 4    Patient was seen today for weekly visit.     Wt Readings from Last 3 Encounters:   24 86.9 kg (191 lb 9.6 oz)   24 85.8 kg (189 lb 3.2 oz)   24 86.7 kg (191 lb 3.2 oz)       BP (!) 180/96   Pulse 62   Temp 97.3 °F (36.3 °C)   Resp 18   Wt 86.9 kg (191 lb 9.6 oz)   SpO2 98%   BMI 29.13 kg/m²     Lab Results   Component Value Date    WBC 6.6 2023    HGB 15.6 2023    HCT 46.5 2023     2023       Comfort Alteration  Fatigue:None, able to perform daily activities    Pain Location:   Pain Intensity (Current): 0 No Pain  Pain Treatment: N/A  Pain Relief: n/a    Emotional Alteration:   Coping: effective    Nutritional Alteration  Anorexia: none   Nausea: queasy yesterday, not nausea  Vomiting: No vomiting     Skin Alteration   Skin reaction: No changes noted    Elimination Alterations  Urinary Frequency: None  Urinary Retention: Normal  Urinary Incontinence: None  Dysuria: None  Nocturia:  x1  Proctitis: None  Diarrhea: stools are looser lately, 2-3 times yesterday, discussed use of imodium  Comments: .    Additional Comments: /94 at the end of OTV    MEDICATIONS:     Current Outpatient Medications   Medication Sig Dispense Refill    atorvastatin (LIPITOR) 40 MG tablet Take 1 tablet

## 2024-04-16 ENCOUNTER — HOSPITAL ENCOUNTER (OUTPATIENT)
Dept: RADIATION ONCOLOGY | Age: 74
Discharge: HOME | End: 2024-04-16
Payer: MEDICARE

## 2024-04-16 DIAGNOSIS — C61 PROSTATE CANCER (HCC): Primary | ICD-10-CM

## 2024-04-16 PROCEDURE — 77373 STRTCTC BDY RAD THER TX DLVR: CPT | Performed by: RADIOLOGY

## 2024-04-16 NOTE — PROGRESS NOTES
Discharge instructions provided and reviewed with patient. Questions answered.  Pt will follow up 5/15/24 with Dr Parisi.

## 2024-04-16 NOTE — PROGRESS NOTES
[facility]           Radiation Oncology      [address] [id number]        O: [phone]        F: [phone]       [facility]                   Dr. Ruben Parisi MD PhD    RADIATION TREATMENT SUMMARY NOTE     Date of Service: 2024  Patient ID: Radu Suarez   : 1950  MRN: [id number]   Acct Number: [id number]       Patient Name:  Radu Suarez,  1950,  74 y.o., male       Referring Physician: Joaquin Carter MD  No address on file      PCP: Matheus Nunez MD       Diagnosis:  Prostate cancer (HCC)  Staging form: Prostate, AJCC 8th Edition  - Clinical stage from 2019: Stage I (cT2a, cN0, cM0, PSA: 7.4, Grade Group: 1) - Signed by Judd Winslow MD on 2019       Recent History:  ***    Site Start TX Last TX ED Fractions Dose Fx Dose Technique   Prostate 24 14 5 4000 cGy 800 cGy SBRT                Concurrent therapy:      ***    Technique:                 ***      Treatment course:       ***    Plan:  ***         Ruben Parisi MD PhD  Radiation Oncologist, [facility]    Department of Radiation Oncology  [facility]

## 2024-05-15 ENCOUNTER — HOSPITAL ENCOUNTER (OUTPATIENT)
Dept: RADIATION ONCOLOGY | Age: 74
Discharge: HOME | End: 2024-05-15
Payer: MEDICARE

## 2024-05-15 VITALS
HEART RATE: 71 BPM | DIASTOLIC BLOOD PRESSURE: 89 MMHG | OXYGEN SATURATION: 95 % | BODY MASS INDEX: 28.78 KG/M2 | WEIGHT: 189.3 LBS | RESPIRATION RATE: 18 BRPM | TEMPERATURE: 97.2 F | SYSTOLIC BLOOD PRESSURE: 134 MMHG

## 2024-05-15 DIAGNOSIS — R97.20 ELEVATED PSA: Primary | ICD-10-CM

## 2024-05-15 DIAGNOSIS — C61 PROSTATE CANCER (HCC): ICD-10-CM

## 2024-05-15 PROCEDURE — 99212 OFFICE O/P EST SF 10 MIN: CPT | Performed by: RADIOLOGY

## 2024-05-15 NOTE — PROGRESS NOTES
NURSING ASSESSMENT     Date: 5/15/2024        Patient Name: Radu Suarez     YOB: 1950      Age:  74 y.o.      MRN: [id number]       Chaperone [] Yes   [x] No      Advance Directives:   Do you currently have completed advance directives (living will)? [x] Yes   [] No         *If yes, please bring us a copy for your records.  *If no, would you like info or assistance in completing advance directives (living will)?   [] Yes   [x] No    Pain Score:   Pain Score (1-10): Denies   Pain Location: NA   Pain Duration: NA   Pain Management/Control: NA      Is pain affecting your ability to take care of yourself or move throughout your home?                        [] Yes   [x] No    General: No Problems  Patient has gained weight [] Yes   [x] No  Patient has lost weight [x] Yes   [] No  How much weight in pounds and over what length of time: Down 2.3 lbs since finishing treatment.    Eyes (Ophthalmic): Wears glasses     Skin (Dermatological): No Problems     ENT: No Problems     Respiratory: Baseline [place]AN[place] at times with walking     Cardiovascular: No Problems      Device   [] Yes   [x] No   Copy of Card Obtained [] Yes   [x] No    Gastrointestinal: H/O hemorrhoids with itching and occasional bleeding. Having 2 soft BM's daily since finishing radiation.    Genito-Urinary: IPSS 9         Occasional urge incontinence at baseline   Frequency   Nocturia times 1    Weak stream          Breast: No Problems     Musculoskeletal: Joint Pain-hips and knees at times    Neurological: No Problems      Hematological and Lymphatic: Easy Bruising-takes effient     Endocrine: No Problems         A 10-point review of systems  has been conducted and pertinent positives have been   recorded. All other review of systems are negative    Was the patient admitted during the course of treatment OR within 30 days of treatment? No        Additional Comments:

## 2024-06-03 SDOH — ECONOMIC STABILITY: FOOD INSECURITY: WITHIN THE PAST 12 MONTHS, YOU WORRIED THAT YOUR FOOD WOULD RUN OUT BEFORE YOU GOT MONEY TO BUY MORE.: NEVER TRUE

## 2024-06-03 SDOH — ECONOMIC STABILITY: FOOD INSECURITY: WITHIN THE PAST 12 MONTHS, THE FOOD YOU BOUGHT JUST DIDN'T LAST AND YOU DIDN'T HAVE MONEY TO GET MORE.: NEVER TRUE

## 2024-06-03 SDOH — ECONOMIC STABILITY: INCOME INSECURITY: HOW HARD IS IT FOR YOU TO PAY FOR THE VERY BASICS LIKE FOOD, HOUSING, MEDICAL CARE, AND HEATING?: NOT VERY HARD

## 2024-06-03 ASSESSMENT — PATIENT HEALTH QUESTIONNAIRE - PHQ9
2. FEELING DOWN, DEPRESSED OR HOPELESS: NOT AT ALL
SUM OF ALL RESPONSES TO PHQ QUESTIONS 1-9: 0
SUM OF ALL RESPONSES TO PHQ9 QUESTIONS 1 & 2: 0
SUM OF ALL RESPONSES TO PHQ9 QUESTIONS 1 & 2: 0
1. LITTLE INTEREST OR PLEASURE IN DOING THINGS: NOT AT ALL
SUM OF ALL RESPONSES TO PHQ QUESTIONS 1-9: 0
2. FEELING DOWN, DEPRESSED OR HOPELESS: NOT AT ALL
SUM OF ALL RESPONSES TO PHQ QUESTIONS 1-9: 0
SUM OF ALL RESPONSES TO PHQ QUESTIONS 1-9: 0
1. LITTLE INTEREST OR PLEASURE IN DOING THINGS: NOT AT ALL

## 2024-06-04 ENCOUNTER — OFFICE VISIT (OUTPATIENT)
Dept: FAMILY MEDICINE CLINIC | Age: 74
End: 2024-06-04
Payer: MEDICARE

## 2024-06-04 VITALS
WEIGHT: 192 LBS | HEIGHT: 68 IN | SYSTOLIC BLOOD PRESSURE: 116 MMHG | RESPIRATION RATE: 14 BRPM | DIASTOLIC BLOOD PRESSURE: 70 MMHG | HEART RATE: 67 BPM | OXYGEN SATURATION: 97 % | BODY MASS INDEX: 29.1 KG/M2

## 2024-06-04 DIAGNOSIS — R25.2 CRAMP OF EXTREMITY: ICD-10-CM

## 2024-06-04 DIAGNOSIS — Z85.46 HISTORY OF PROSTATE CANCER: ICD-10-CM

## 2024-06-04 DIAGNOSIS — I25.119 ATHEROSCLEROSIS OF NATIVE CORONARY ARTERY OF NATIVE HEART WITH ANGINA PECTORIS (HCC): ICD-10-CM

## 2024-06-04 DIAGNOSIS — I10 ESSENTIAL HYPERTENSION: Primary | ICD-10-CM

## 2024-06-04 DIAGNOSIS — E78.5 HYPERLIPIDEMIA, UNSPECIFIED HYPERLIPIDEMIA TYPE: ICD-10-CM

## 2024-06-04 DIAGNOSIS — I10 ESSENTIAL HYPERTENSION: ICD-10-CM

## 2024-06-04 LAB
ANION GAP SERPL CALCULATED.3IONS-SCNC: 10 MEQ/L (ref 9–15)
BUN SERPL-MCNC: 23 MG/DL (ref 8–23)
CALCIUM SERPL-MCNC: 9.1 MG/DL (ref 8.5–9.9)
CHLORIDE SERPL-SCNC: 107 MEQ/L (ref 95–107)
CO2 SERPL-SCNC: 23 MEQ/L (ref 20–31)
CREAT SERPL-MCNC: 0.86 MG/DL (ref 0.7–1.2)
GLUCOSE SERPL-MCNC: 76 MG/DL (ref 70–99)
MAGNESIUM SERPL-MCNC: 2.2 MG/DL (ref 1.7–2.4)
POTASSIUM SERPL-SCNC: 4.2 MEQ/L (ref 3.4–4.9)
SODIUM SERPL-SCNC: 140 MEQ/L (ref 135–144)

## 2024-06-04 PROCEDURE — 3074F SYST BP LT 130 MM HG: CPT | Performed by: INTERNAL MEDICINE

## 2024-06-04 PROCEDURE — 3078F DIAST BP <80 MM HG: CPT | Performed by: INTERNAL MEDICINE

## 2024-06-04 PROCEDURE — G8427 DOCREV CUR MEDS BY ELIG CLIN: HCPCS | Performed by: INTERNAL MEDICINE

## 2024-06-04 PROCEDURE — 3017F COLORECTAL CA SCREEN DOC REV: CPT | Performed by: INTERNAL MEDICINE

## 2024-06-04 PROCEDURE — 1123F ACP DISCUSS/DSCN MKR DOCD: CPT | Performed by: INTERNAL MEDICINE

## 2024-06-04 PROCEDURE — 1036F TOBACCO NON-USER: CPT | Performed by: INTERNAL MEDICINE

## 2024-06-04 PROCEDURE — G8417 CALC BMI ABV UP PARAM F/U: HCPCS | Performed by: INTERNAL MEDICINE

## 2024-06-04 PROCEDURE — 99214 OFFICE O/P EST MOD 30 MIN: CPT | Performed by: INTERNAL MEDICINE

## 2024-06-04 ASSESSMENT — ENCOUNTER SYMPTOMS
EYE PAIN: 0
EYE DISCHARGE: 0
ABDOMINAL PAIN: 0
ABDOMINAL DISTENTION: 0
BACK PAIN: 0
EYE ITCHING: 0
EYE REDNESS: 0
VOICE CHANGE: 0
COUGH: 0
PHOTOPHOBIA: 0
CONSTIPATION: 0
VOMITING: 0
SINUS PRESSURE: 0
DIARRHEA: 0
FACIAL SWELLING: 0
SHORTNESS OF BREATH: 0
SORE THROAT: 0
COLOR CHANGE: 0
APNEA: 0
SINUS PAIN: 0
RECTAL PAIN: 0
CHEST TIGHTNESS: 0
RHINORRHEA: 0
WHEEZING: 0
TROUBLE SWALLOWING: 0
BLOOD IN STOOL: 0
NAUSEA: 0

## 2024-06-04 NOTE — PROGRESS NOTES
Subjective:      Patient ID: Radu Suarez is a 74 y.o. male New patient, here for evaluation of the following chief complaint(s):  Chief Complaint   Patient presents with    Hypertension         Hypertension  Pertinent negatives include no chest pain, headaches, neck pain, palpitations or shortness of breath.   Knee Pain   Pertinent negatives include no numbness.       History of prostate cancer with elevated PSA PSA: The patient's most recent PSA has normalized.    Cramping: present for approx 1 week.         Coronary artery disease involving native heart, unspecified vessel or lesion type, unspecified whether angina present-compliant with aspirin 81 mg orally daily, Effient 10 mg orally daily and metoprolol 25 mg orally daily.        Essential hypertension-compliant with Zestril 2.5 mg orally daily and Toprol 25 mg oral daily.        Prostate cancer (HCC): strong family history  Colon CA: Maternal Grandmother.        Moderate mixed hyperlipidemia not requiring statin therapy       S/p brachy therapy.      Health maintenance the patient is due for pneumococcal vaccination at this time.    At present he denies polyuria,  Polydipsia, constitutional, sinus, visual, cardiopulmonary, urologic, gastrointestinal, immunologic/hematologic, musculoskeletal, neurologic,dermatologic, or psychiatric complaints.    Review of Systems   Constitutional:  Negative for chills, diaphoresis, fatigue and fever.   HENT:  Negative for congestion, dental problem, drooling, ear discharge, ear pain, facial swelling, hearing loss, mouth sores, nosebleeds, postnasal drip, rhinorrhea, sinus pressure, sinus pain, sneezing, sore throat, tinnitus, trouble swallowing and voice change.    Eyes:  Negative for photophobia, pain, discharge, redness, itching and visual disturbance.   Respiratory:  Negative for apnea, cough, chest tightness, shortness of breath and wheezing.    Cardiovascular:  Negative for chest pain, palpitations and leg swelling.

## 2024-06-28 DIAGNOSIS — C61 PROSTATE CANCER (HCC): ICD-10-CM

## 2024-06-28 DIAGNOSIS — R97.20 ELEVATED PSA: ICD-10-CM

## 2024-06-28 LAB — PSA SERPL-MCNC: 2.1 NG/ML (ref 0–4)

## 2024-07-01 ENCOUNTER — TELEMEDICINE (OUTPATIENT)
Dept: FAMILY MEDICINE CLINIC | Age: 74
End: 2024-07-01

## 2024-07-01 DIAGNOSIS — Z00.00 MEDICARE ANNUAL WELLNESS VISIT, SUBSEQUENT: Primary | ICD-10-CM

## 2024-07-01 ASSESSMENT — PATIENT HEALTH QUESTIONNAIRE - PHQ9
SUM OF ALL RESPONSES TO PHQ QUESTIONS 1-9: 0
1. LITTLE INTEREST OR PLEASURE IN DOING THINGS: NOT AT ALL
SUM OF ALL RESPONSES TO PHQ QUESTIONS 1-9: 0
SUM OF ALL RESPONSES TO PHQ9 QUESTIONS 1 & 2: 0
SUM OF ALL RESPONSES TO PHQ QUESTIONS 1-9: 0
2. FEELING DOWN, DEPRESSED OR HOPELESS: NOT AT ALL
SUM OF ALL RESPONSES TO PHQ QUESTIONS 1-9: 0

## 2024-07-01 ASSESSMENT — LIFESTYLE VARIABLES
HOW OFTEN DURING THE LAST YEAR HAVE YOU NEEDED AN ALCOHOLIC DRINK FIRST THING IN THE MORNING TO GET YOURSELF GOING AFTER A NIGHT OF HEAVY DRINKING: NEVER
HOW OFTEN DURING THE LAST YEAR HAVE YOU FOUND THAT YOU WERE NOT ABLE TO STOP DRINKING ONCE YOU HAD STARTED: NEVER
HOW OFTEN DURING THE LAST YEAR HAVE YOU FAILED TO DO WHAT WAS NORMALLY EXPECTED FROM YOU BECAUSE OF DRINKING: NEVER
HOW OFTEN DURING THE LAST YEAR HAVE YOU BEEN UNABLE TO REMEMBER WHAT HAPPENED THE NIGHT BEFORE BECAUSE YOU HAD BEEN DRINKING: NEVER
HOW OFTEN DURING THE LAST YEAR HAVE YOU HAD A FEELING OF GUILT OR REMORSE AFTER DRINKING: NEVER
HAVE YOU OR SOMEONE ELSE BEEN INJURED AS A RESULT OF YOUR DRINKING: NO
HAS A RELATIVE, FRIEND, DOCTOR, OR ANOTHER HEALTH PROFESSIONAL EXPRESSED CONCERN ABOUT YOUR DRINKING OR SUGGESTED YOU CUT DOWN: NO
HOW MANY STANDARD DRINKS CONTAINING ALCOHOL DO YOU HAVE ON A TYPICAL DAY: 3 OR 4
HOW OFTEN DO YOU HAVE A DRINK CONTAINING ALCOHOL: 4 OR MORE TIMES A WEEK

## 2024-07-01 NOTE — PROGRESS NOTES
Home: [address] [place] [address]  Provider was located at Facility (Appt Dept): [address] [place] [address]  Confirm you are appropriately licensed, registered, or certified to deliver care in the state where the patient is located as indicated above. If you are not or unsure, please re-schedule the visit: Yes, I confirm.       This encounter was performed under my, Matheus Nunez MD’s, direct supervision, 7/1/2024.

## 2024-07-29 ENCOUNTER — HOSPITAL ENCOUNTER (OUTPATIENT)
Dept: RADIATION ONCOLOGY | Age: 74
Discharge: HOME | End: 2024-07-29
Payer: MEDICARE

## 2024-07-29 VITALS
BODY MASS INDEX: 28.8 KG/M2 | SYSTOLIC BLOOD PRESSURE: 112 MMHG | HEART RATE: 73 BPM | DIASTOLIC BLOOD PRESSURE: 74 MMHG | TEMPERATURE: 97.5 F | RESPIRATION RATE: 16 BRPM | WEIGHT: 189.4 LBS | OXYGEN SATURATION: 98 %

## 2024-07-29 DIAGNOSIS — R97.20 ELEVATED PSA: Primary | ICD-10-CM

## 2024-07-29 DIAGNOSIS — C61 PROSTATE CANCER (HCC): ICD-10-CM

## 2024-07-29 PROCEDURE — G2211 COMPLEX E/M VISIT ADD ON: HCPCS | Performed by: RADIOLOGY

## 2024-07-29 PROCEDURE — 99214 OFFICE O/P EST MOD 30 MIN: CPT | Performed by: RADIOLOGY

## 2024-07-29 PROCEDURE — 99212 OFFICE O/P EST SF 10 MIN: CPT | Performed by: RADIOLOGY

## 2024-07-29 NOTE — PROGRESS NOTES
NURSING ASSESSMENT     Date: 7/29/2024        Patient Name: Radu Suarez     YOB: 1950      Age:  74 y.o.      MRN: [id number]       Chaperone [] Yes   [x] No      Advance Directives:   Do you currently have completed advance directives (living will)? [x] Yes   [] No         *If yes, please bring us a copy for your records.  *If no, would you like info or assistance in completing advance directives (living will)?   [] Yes   [x] No    Pain Score:   Pain Score (1-10): Denies   Pain Location: NA   Pain Duration: NA   Pain Management/Control: NA      Is pain affecting your ability to take care of yourself or move throughout your home?                        [] Yes   [] No    General: No Problems-appetite is good  Patient has gained weight [] Yes   [x] No  Patient has lost weight [] Yes   [x] No  How much weight in pounds and over what length of time:     Eyes (Ophthalmic): wears glasses     Skin (Dermatological): No Problems     ENT: No Problems     Respiratory: HANSON at times with walking at baseline     Cardiovascular: No problems      Device   [] Yes   [x] No   Copy of Card Obtained [] Yes   [x] No    Gastrointestinal: h/o hemorrhoids with itching and occasional bleeding. Having 2-3 daily soft BM's    Genito-Urinary: IPPS  8   No change in urge incontinence from  last visit.    Nocturia times 1    Weak stream    Breast: No Problems     Musculoskeletal: Joint Pain-Hips and knees more frequent lastely    Neurological: No Problems      Hematological and Lymphatic: Easy Bruising-takes effient     Endocrine: No Problems         A 10-point review of systems  has been conducted and pertinent positives have been   recorded. All other review of systems are negative    Was the patient admitted during the course of treatment OR within 30 days of treatment? No         Additional Comments:

## 2024-10-09 NOTE — PROGRESS NOTES
[facility]           Radiation Oncology      [address] [place], [place] [address]        O: [phone]        F: [phone]       [facility]                   Dr. Ruben Parisi MD PhD    FOLLOW-UP NOTE     Date of Service: 2024  Patient ID: Radu Suarez   : 1950  MRN: [id number]   Acct Number: [id number]       NAME:  Radu Suarez    :  1950 74 y.o. male     PCP: Matheus Nunez MD    REFERRING PROVIDER: Raul    DIAGNOSIS:  1. Elevated PSA    2. Prostate cancer (HCC)        STAGING: Cancer Staging   Prostate cancer (HCC)  Staging form: Prostate, AJCC 8th Edition  - Clinical stage from 2019: Stage I (cT2a, cN0, cM0, PSA: 7.4, Grade Group: 1) - Signed by Judd Winslow MD on 2019      PRIOR TREATMENT: Transperineal ultrasound guided prostate brachytherapy, Iodine 125, 145Gy, implanted 65 seeds using 20 needles.  Implant date 20. SpaceOAR gel placed 20.     TIME SINCE TREATMENT: Approximately 44 months from initial brachytherapy and 3 months from reirradiation.     RECENT HISTORY: Radu Suarez returns for routine follow-up approximately 3 months post completion of salvage repeat radiation therapy to the area of recurrent disease within the prostate after Barrigel insertion on 3/8/2024.  Symptomatically he notes stable bowel movements with resolution of bleeding.  He states his energy is back to baseline and his urinary symptoms are baseline.  He has a good appetite.  On 2020 4 repeat PSA was 2.10 representing a decrease from his prior pretreatment PSA.  He otherwise denies any other complaints at this time.       Past medical, surgical, social and family histories reviewed and updated as indicated.    ALLERGIES:  Penicillins and Sulfamethoxazole-trimethoprim       MEDICATIONS:   Current Outpatient Medications:     atorvastatin (LIPITOR) 40 MG tablet, Take 1 tablet by mouth daily, Disp: , Rfl:     Glucosamine-Chondroitin-MSM

## 2024-10-31 DIAGNOSIS — C61 PROSTATE CANCER (HCC): ICD-10-CM

## 2024-10-31 DIAGNOSIS — E78.5 HYPERLIPIDEMIA, UNSPECIFIED HYPERLIPIDEMIA TYPE: ICD-10-CM

## 2024-10-31 DIAGNOSIS — R97.20 ELEVATED PSA: ICD-10-CM

## 2024-10-31 DIAGNOSIS — I10 ESSENTIAL HYPERTENSION: ICD-10-CM

## 2024-10-31 LAB
BASOPHILS # BLD: 0 K/UL (ref 0–0.2)
BASOPHILS NFR BLD: 0.5 %
CHOLEST SERPL-MCNC: 149 MG/DL (ref 0–199)
EOSINOPHIL # BLD: 0.2 K/UL (ref 0–0.7)
EOSINOPHIL NFR BLD: 2.5 %
ERYTHROCYTE [DISTWIDTH] IN BLOOD BY AUTOMATED COUNT: 12.6 % (ref 11.5–14.5)
HCT VFR BLD AUTO: 45.8 % (ref 42–52)
HDLC SERPL-MCNC: 54 MG/DL (ref 40–59)
HGB BLD-MCNC: 16.1 G/DL (ref 14–18)
LDL CHOLESTEROL: 58 MG/DL (ref 0–129)
LYMPHOCYTES # BLD: 1.2 K/UL (ref 1–4.8)
LYMPHOCYTES NFR BLD: 18.4 %
MCH RBC QN AUTO: 34.3 PG (ref 27–31.3)
MCHC RBC AUTO-ENTMCNC: 35.2 % (ref 33–37)
MCV RBC AUTO: 97.7 FL (ref 79–92.2)
MONOCYTES # BLD: 0.5 K/UL (ref 0.2–0.8)
MONOCYTES NFR BLD: 7.5 %
NEUTROPHILS # BLD: 4.4 K/UL (ref 1.4–6.5)
NEUTS SEG NFR BLD: 70.6 %
PLATELET # BLD AUTO: 197 K/UL (ref 130–400)
PSA SERPL-MCNC: 1.15 NG/ML (ref 0–4)
RBC # BLD AUTO: 4.69 M/UL (ref 4.7–6.1)
TRIGLYCERIDE, FASTING: 183 MG/DL (ref 0–150)
WBC # BLD AUTO: 6.3 K/UL (ref 4.8–10.8)

## 2024-11-05 ENCOUNTER — HOSPITAL ENCOUNTER (OUTPATIENT)
Dept: RADIATION ONCOLOGY | Age: 74
Discharge: HOME | End: 2024-11-05
Payer: MEDICARE

## 2024-11-05 VITALS
WEIGHT: 186.2 LBS | DIASTOLIC BLOOD PRESSURE: 85 MMHG | BODY MASS INDEX: 28.31 KG/M2 | RESPIRATION RATE: 18 BRPM | SYSTOLIC BLOOD PRESSURE: 123 MMHG | OXYGEN SATURATION: 95 % | TEMPERATURE: 97.5 F | HEART RATE: 72 BPM

## 2024-11-05 DIAGNOSIS — R97.20 ELEVATED PSA: Primary | ICD-10-CM

## 2024-11-05 DIAGNOSIS — C61 PROSTATE CANCER (HCC): ICD-10-CM

## 2024-11-05 PROCEDURE — 99214 OFFICE O/P EST MOD 30 MIN: CPT | Performed by: RADIOLOGY

## 2024-11-05 PROCEDURE — 99212 OFFICE O/P EST SF 10 MIN: CPT | Performed by: RADIOLOGY

## 2024-11-05 NOTE — PROGRESS NOTES
NURSING ASSESSMENT     Date: 11/5/2024        Patient Name: Radu Suarez     YOB: 1950      Age:  74 y.o.      MRN: [id number]       Chaperone [] Yes   [x] No      Advance Directives:   Do you currently have completed advance directives (living will)? [x] Yes   [] No         *If yes, please bring us a copy for your records.  *If no, would you like info or assistance in completing advance directives (living will)?   [] Yes   [x] No    Pain Score:   Pain Score (1-10): Denies   Pain Location: NA   Pain Duration: NA   Pain Management/Control: NA      Is pain affecting your ability to take care of yourself or move throughout your home?                        [] Yes   [x] No    General: No Problems  Patient has gained weight [] Yes   [x] No  Patient has lost weight [x] Yes   [] No  Down 3 lbs from last visit on 5/15/24  How much weight in pounds and over what length of time:     Eyes (Ophthalmic): wears glasses     Skin (Dermatological): No Problems     ENT: No Problems     Respiratory: Mild HANSON with walking at baseline     Cardiovascular: No Problems      Device   [] Yes   [x] No   Copy of Card Obtained [] Yes   [x] No    Gastrointestinal: H/O hemorrhoids with itching and bleeding on occasion. Having 2-3 soft daily soft BM's. Occasional constipation if eats too much cheese.    Genito-Urinary: IPSS is 5   Frequency   Nocturia times 1    Weak stream          Breast: No Problems     Musculoskeletal: Baseline joint pain to hips and knees    Neurological: No Problems      Hematological and Lymphatic: Easy Bruising-takes Effient     Endocrine: No Problems         A 10-point review of systems  has been conducted and pertinent positives have been   recorded. All other review of systems are negative    Was the patient admitted during the course of treatment OR within 30 days of treatment?  No      Additional Comments:

## 2024-11-12 ENCOUNTER — OFFICE VISIT (OUTPATIENT)
Dept: FAMILY MEDICINE CLINIC | Age: 74
End: 2024-11-12

## 2024-11-12 VITALS
HEART RATE: 63 BPM | HEIGHT: 68 IN | DIASTOLIC BLOOD PRESSURE: 74 MMHG | RESPIRATION RATE: 14 BRPM | WEIGHT: 191 LBS | OXYGEN SATURATION: 98 % | SYSTOLIC BLOOD PRESSURE: 122 MMHG | BODY MASS INDEX: 28.95 KG/M2

## 2024-11-12 DIAGNOSIS — I25.119 ATHEROSCLEROSIS OF NATIVE CORONARY ARTERY OF NATIVE HEART WITH ANGINA PECTORIS (HCC): ICD-10-CM

## 2024-11-12 DIAGNOSIS — I10 ESSENTIAL HYPERTENSION: Primary | ICD-10-CM

## 2024-11-12 DIAGNOSIS — E78.5 HYPERLIPIDEMIA, UNSPECIFIED HYPERLIPIDEMIA TYPE: ICD-10-CM

## 2024-11-12 DIAGNOSIS — Z85.46 HISTORY OF PROSTATE CANCER: ICD-10-CM

## 2024-11-12 NOTE — PROGRESS NOTES
Radu Suarez (:  1950) is a 74 y.o. male, Established patient, here for evaluation of the following chief complaint(s):  Hypertension          Subjective   History of Present Illness  The patient presents for evaluation of his blood work.          History of prostate cancer status post brachii and subsequent radiation therapy.: The patient's most recent PSA has normalized.He reports no current health concerns. He mentions that his nurse contacted him regarding some irregularities in his blood test results. He had a follow-up appointment with Dr. Valdes last Tuesday, during which he was informed that his Prostate-Specific Antigen (PSA) levels are on a consistent downward trend.        Hypertriglyceridemia: The patient's most recent laboratory assessment revealed a triglyceride of 183. He has been making dietary changes, including reducing his milk intake and eliminating desserts from his diet. His current diet includes hard-boiled eggs, kippers, and stuffed grape leaves. He has been following this new diet for approximately 3 months. He also mentions that he does not consume the same breakfast every day. He is currently taking Lipitor 40 mg for cholesterol management.          Overweight:  He has been trying to lose weight, but without much success.            Coronary artery disease involving native heart, unspecified vessel or lesion type, unspecified whether angina present-compliant with aspirin 81 mg orally daily, Effient 10 mg orally daily and metoprolol 25 mg orally daily.            Essential hypertension-compliant with Zestril 2.5 mg orally daily and Toprol 25 mg oral daily.  The patient's present blood pressure is 122/74.              Family history: strong family history of prostate cancer  Colon CA: Maternal Grandmother.             S/p brachy therapy.          SOCIAL HISTORY  He drinks alcohol.    Past Medical History:   Diagnosis Date    CAD (coronary artery disease)     1 stent 14    Colon

## 2024-11-30 NOTE — PROGRESS NOTES
[facility]           Radiation Oncology      [address] [id number]        O: [phone]        F: [phone]       [facility]                   Dr. Ruben Parisi MD PhD    FOLLOW-UP NOTE     Date of Service: 2024  Patient ID: Radu Suarez YOB: 1950  MRN: [id number]   Acct Number: [id number]       NAME:  Radu Suarez    :  1950 74 y.o. male     PCP: Matheus Nunez MD    REFERRING PROVIDER: Raul    DIAGNOSIS:  1. Elevated PSA    2. Prostate cancer (HCC)        STAGING: Cancer Staging   Prostate cancer (HCC)  Staging form: Prostate, AJCC 8th Edition  - Clinical stage from 2019: Stage I (cT2a, cN0, cM0, PSA: 7.4, Grade Group: 1) - Signed by Judd Winslow MD on 2019      PRIOR TREATMENT: Transperineal ultrasound guided prostate brachytherapy, Iodine 125, 145Gy, implanted 65 seeds using 20 needles.  Implant date 20. SpaceOAR gel placed 20.     TIME SINCE TREATMENT: Approximately 47 months from initial brachytherapy and 6 months from reirradiation.     RECENT HISTORY: Radu Suarez returns for routine follow-up approximately 6 months post completion of salvage repeat radiation therapy to the area of recurrent disease within the prostate after Barrigel insertion on 3/8/2024.  Symptomatically he notes stable bowel movements with resolution of bleeding.  He has an IPSS score of 5 today.  He notes occasional constipation and occasional urge incontinence but both of these are baseline for him.  He is staying fairly active walking 3 to 4 miles per day.      On 10/31/2024 repeat serum PSA was 1.15, down from prior value of 2.10 indicating continued biochemical response.     Past medical, surgical, social and family histories reviewed and updated as indicated.    ALLERGIES:  Penicillins and Sulfamethoxazole-trimethoprim       MEDICATIONS:   Current Outpatient Medications:     atorvastatin (LIPITOR) 40 MG tablet, Take 1 tablet by

## 2025-04-10 ENCOUNTER — RESULTS FOLLOW-UP (OUTPATIENT)
Age: 75
End: 2025-04-10

## 2025-04-10 DIAGNOSIS — I10 ESSENTIAL HYPERTENSION: ICD-10-CM

## 2025-04-10 DIAGNOSIS — E78.5 HYPERLIPIDEMIA, UNSPECIFIED HYPERLIPIDEMIA TYPE: ICD-10-CM

## 2025-04-10 LAB
ALBUMIN SERPL-MCNC: 4.4 G/DL (ref 3.5–4.6)
ALP SERPL-CCNC: 103 U/L (ref 35–104)
ALT SERPL-CCNC: 24 U/L (ref 0–41)
ANION GAP SERPL CALCULATED.3IONS-SCNC: 11 MEQ/L (ref 9–15)
AST SERPL-CCNC: 20 U/L (ref 0–40)
BILIRUB SERPL-MCNC: 0.8 MG/DL (ref 0.2–0.7)
BUN SERPL-MCNC: 18 MG/DL (ref 8–23)
CALCIUM SERPL-MCNC: 9 MG/DL (ref 8.5–9.9)
CHLORIDE SERPL-SCNC: 105 MEQ/L (ref 95–107)
CHOLEST SERPL-MCNC: 144 MG/DL (ref 0–199)
CO2 SERPL-SCNC: 25 MEQ/L (ref 20–31)
CREAT SERPL-MCNC: 0.89 MG/DL (ref 0.7–1.2)
GLOBULIN SER CALC-MCNC: 2.6 G/DL (ref 2.3–3.5)
GLUCOSE SERPL-MCNC: 88 MG/DL (ref 70–99)
HDLC SERPL-MCNC: 55 MG/DL (ref 40–59)
LDL CHOLESTEROL: 61 MG/DL (ref 0–129)
POTASSIUM SERPL-SCNC: 4.2 MEQ/L (ref 3.4–4.9)
PROT SERPL-MCNC: 7 G/DL (ref 6.3–8)
SODIUM SERPL-SCNC: 141 MEQ/L (ref 135–144)
TRIGLYCERIDE, FASTING: 138 MG/DL (ref 0–150)

## 2025-04-14 ENCOUNTER — OFFICE VISIT (OUTPATIENT)
Age: 75
End: 2025-04-14
Payer: MEDICARE

## 2025-04-14 VITALS
HEART RATE: 71 BPM | HEIGHT: 68 IN | WEIGHT: 191 LBS | DIASTOLIC BLOOD PRESSURE: 80 MMHG | BODY MASS INDEX: 28.95 KG/M2 | OXYGEN SATURATION: 98 % | RESPIRATION RATE: 14 BRPM | SYSTOLIC BLOOD PRESSURE: 132 MMHG

## 2025-04-14 DIAGNOSIS — I25.119 ATHEROSCLEROSIS OF NATIVE CORONARY ARTERY OF NATIVE HEART WITH ANGINA PECTORIS: ICD-10-CM

## 2025-04-14 DIAGNOSIS — I10 ESSENTIAL HYPERTENSION: Primary | ICD-10-CM

## 2025-04-14 DIAGNOSIS — E78.5 HYPERLIPIDEMIA, UNSPECIFIED HYPERLIPIDEMIA TYPE: ICD-10-CM

## 2025-04-14 PROBLEM — C61 PROSTATE CANCER (HCC): Status: RESOLVED | Noted: 2018-05-31 | Resolved: 2025-04-14

## 2025-04-14 PROCEDURE — 1159F MED LIST DOCD IN RCRD: CPT | Performed by: INTERNAL MEDICINE

## 2025-04-14 PROCEDURE — 99214 OFFICE O/P EST MOD 30 MIN: CPT | Performed by: INTERNAL MEDICINE

## 2025-04-14 PROCEDURE — G8417 CALC BMI ABV UP PARAM F/U: HCPCS | Performed by: INTERNAL MEDICINE

## 2025-04-14 PROCEDURE — 3075F SYST BP GE 130 - 139MM HG: CPT | Performed by: INTERNAL MEDICINE

## 2025-04-14 PROCEDURE — 1123F ACP DISCUSS/DSCN MKR DOCD: CPT | Performed by: INTERNAL MEDICINE

## 2025-04-14 PROCEDURE — G8427 DOCREV CUR MEDS BY ELIG CLIN: HCPCS | Performed by: INTERNAL MEDICINE

## 2025-04-14 PROCEDURE — 3017F COLORECTAL CA SCREEN DOC REV: CPT | Performed by: INTERNAL MEDICINE

## 2025-04-14 PROCEDURE — 1036F TOBACCO NON-USER: CPT | Performed by: INTERNAL MEDICINE

## 2025-04-14 PROCEDURE — 3079F DIAST BP 80-89 MM HG: CPT | Performed by: INTERNAL MEDICINE

## 2025-04-14 PROCEDURE — 99213 OFFICE O/P EST LOW 20 MIN: CPT | Performed by: INTERNAL MEDICINE

## 2025-04-14 SDOH — ECONOMIC STABILITY: FOOD INSECURITY: WITHIN THE PAST 12 MONTHS, YOU WORRIED THAT YOUR FOOD WOULD RUN OUT BEFORE YOU GOT MONEY TO BUY MORE.: NEVER TRUE

## 2025-04-14 SDOH — ECONOMIC STABILITY: TRANSPORTATION INSECURITY
IN THE PAST 12 MONTHS, HAS THE LACK OF TRANSPORTATION KEPT YOU FROM MEDICAL APPOINTMENTS OR FROM GETTING MEDICATIONS?: NO

## 2025-04-14 SDOH — ECONOMIC STABILITY: FOOD INSECURITY: WITHIN THE PAST 12 MONTHS, THE FOOD YOU BOUGHT JUST DIDN'T LAST AND YOU DIDN'T HAVE MONEY TO GET MORE.: NEVER TRUE

## 2025-04-14 SDOH — ECONOMIC STABILITY: INCOME INSECURITY: IN THE LAST 12 MONTHS, WAS THERE A TIME WHEN YOU WERE NOT ABLE TO PAY THE MORTGAGE OR RENT ON TIME?: NO

## 2025-04-14 ASSESSMENT — PATIENT HEALTH QUESTIONNAIRE - PHQ9
2. FEELING DOWN, DEPRESSED OR HOPELESS: NOT AT ALL
SUM OF ALL RESPONSES TO PHQ9 QUESTIONS 1 & 2: 0
SUM OF ALL RESPONSES TO PHQ QUESTIONS 1-9: 0
SUM OF ALL RESPONSES TO PHQ QUESTIONS 1-9: 0
2. FEELING DOWN, DEPRESSED OR HOPELESS: NOT AT ALL
1. LITTLE INTEREST OR PLEASURE IN DOING THINGS: NOT AT ALL
SUM OF ALL RESPONSES TO PHQ QUESTIONS 1-9: 0
SUM OF ALL RESPONSES TO PHQ QUESTIONS 1-9: 0
1. LITTLE INTEREST OR PLEASURE IN DOING THINGS: NOT AT ALL

## 2025-04-14 NOTE — PROGRESS NOTES
Radu Suarez (:  1950) is a 75 y.o. male, Established patient, here for evaluation of the following chief complaint(s):  Hypertension and Discuss Labs          Subjective   History of Present Illness      The patient presents for evaluation of blood pressure and cholesterol management.    History of prostate cancer status post brachii and subsequent radiation therapy.: The patient's most recent PSA has normalized.He reports no current health concerns. He mentions that his nurse contacted him regarding some irregularities in his blood test results. He had a follow-up appointment with Dr. Valdes last Tuesday, during which he was informed that his Prostate-Specific Antigen (PSA) levels are on a consistent downward trend.        Hypertriglyceridemia: The patient's most recent laboratory assessment revealed a triglyceride of 183. He has been making dietary changes, including reducing his milk intake and eliminating desserts from his diet. His current diet includes hard-boiled eggs, kippers, and stuffed grape leaves. He has been following this new diet for approximately 3 months. He also mentions that he does not consume the same breakfast every day. He is currently taking Lipitor 40 mg for cholesterol management.          Coronary artery disease involving native heart, unspecified vessel or lesion type, unspecified whether angina present-compliant with aspirin 81 mg orally daily, Effient 10 mg orally daily and metoprolol 25 mg orally daily.            Essential hypertension-compliant with Zestril 2.5 mg orally daily and Toprol 25 mg oral daily.  The patient's present blood pressure is 122/74.              Family history: strong family history of prostate cancer  Colon CA: Maternal Grandmother.        An abnormality in recent blood test results was reported, specifically a mildly elevated bilirubin level of 0.8. Kidney function, as indicated by a GFR greater than 90, is within the normal range. Liver function

## 2025-07-12 SDOH — HEALTH STABILITY: PHYSICAL HEALTH: ON AVERAGE, HOW MANY DAYS PER WEEK DO YOU ENGAGE IN MODERATE TO STRENUOUS EXERCISE (LIKE A BRISK WALK)?: 5 DAYS

## 2025-07-12 SDOH — HEALTH STABILITY: PHYSICAL HEALTH: ON AVERAGE, HOW MANY MINUTES DO YOU ENGAGE IN EXERCISE AT THIS LEVEL?: 60 MIN

## 2025-07-12 ASSESSMENT — LIFESTYLE VARIABLES
HOW OFTEN DURING THE LAST YEAR HAVE YOU FOUND THAT YOU WERE NOT ABLE TO STOP DRINKING ONCE YOU HAD STARTED: LESS THAN MONTHLY
HAVE YOU OR SOMEONE ELSE BEEN INJURED AS A RESULT OF YOUR DRINKING: NO
HOW OFTEN DURING THE LAST YEAR HAVE YOU NEEDED AN ALCOHOLIC DRINK FIRST THING IN THE MORNING TO GET YOURSELF GOING AFTER A NIGHT OF HEAVY DRINKING: NEVER
HOW OFTEN DO YOU HAVE SIX OR MORE DRINKS ON ONE OCCASION: 4
HOW OFTEN DURING THE LAST YEAR HAVE YOU FAILED TO DO WHAT WAS NORMALLY EXPECTED FROM YOU BECAUSE OF DRINKING: NEVER
HOW OFTEN DURING THE LAST YEAR HAVE YOU FOUND THAT YOU WERE NOT ABLE TO STOP DRINKING ONCE YOU HAD STARTED: LESS THAN MONTHLY
HOW OFTEN DO YOU HAVE A DRINK CONTAINING ALCOHOL: 5
HOW OFTEN DURING THE LAST YEAR HAVE YOU HAD A FEELING OF GUILT OR REMORSE AFTER DRINKING: LESS THAN MONTHLY
HOW MANY STANDARD DRINKS CONTAINING ALCOHOL DO YOU HAVE ON A TYPICAL DAY: 2
HOW OFTEN DO YOU HAVE A DRINK CONTAINING ALCOHOL: 4 OR MORE TIMES A WEEK
HAS A RELATIVE, FRIEND, DOCTOR, OR ANOTHER HEALTH PROFESSIONAL EXPRESSED CONCERN ABOUT YOUR DRINKING OR SUGGESTED YOU CUT DOWN: NO
HAVE YOU OR SOMEONE ELSE BEEN INJURED AS A RESULT OF YOUR DRINKING: NO
HOW OFTEN DURING THE LAST YEAR HAVE YOU FAILED TO DO WHAT WAS NORMALLY EXPECTED FROM YOU BECAUSE OF DRINKING: NEVER
HOW OFTEN DURING THE LAST YEAR HAVE YOU HAD A FEELING OF GUILT OR REMORSE AFTER DRINKING: LESS THAN MONTHLY
HOW OFTEN DURING THE LAST YEAR HAVE YOU BEEN UNABLE TO REMEMBER WHAT HAPPENED THE NIGHT BEFORE BECAUSE YOU HAD BEEN DRINKING: NEVER
HOW MANY STANDARD DRINKS CONTAINING ALCOHOL DO YOU HAVE ON A TYPICAL DAY: 3 OR 4
HAS A RELATIVE, FRIEND, DOCTOR, OR ANOTHER HEALTH PROFESSIONAL EXPRESSED CONCERN ABOUT YOUR DRINKING OR SUGGESTED YOU CUT DOWN: NO
HOW OFTEN DURING THE LAST YEAR HAVE YOU NEEDED AN ALCOHOLIC DRINK FIRST THING IN THE MORNING TO GET YOURSELF GOING AFTER A NIGHT OF HEAVY DRINKING: NEVER
HOW OFTEN DURING THE LAST YEAR HAVE YOU BEEN UNABLE TO REMEMBER WHAT HAPPENED THE NIGHT BEFORE BECAUSE YOU HAD BEEN DRINKING: NEVER

## 2025-07-12 ASSESSMENT — PATIENT HEALTH QUESTIONNAIRE - PHQ9
2. FEELING DOWN, DEPRESSED OR HOPELESS: NOT AT ALL
SUM OF ALL RESPONSES TO PHQ QUESTIONS 1-9: 0
SUM OF ALL RESPONSES TO PHQ QUESTIONS 1-9: 0
1. LITTLE INTEREST OR PLEASURE IN DOING THINGS: NOT AT ALL
SUM OF ALL RESPONSES TO PHQ QUESTIONS 1-9: 0
SUM OF ALL RESPONSES TO PHQ QUESTIONS 1-9: 0

## 2025-07-14 ENCOUNTER — OFFICE VISIT (OUTPATIENT)
Age: 75
End: 2025-07-14
Payer: MEDICARE

## 2025-07-14 DIAGNOSIS — Z87.891 PERSONAL HISTORY OF TOBACCO USE: ICD-10-CM

## 2025-07-14 DIAGNOSIS — Z00.00 MEDICARE ANNUAL WELLNESS VISIT, SUBSEQUENT: Primary | ICD-10-CM

## 2025-07-14 PROCEDURE — G0439 PPPS, SUBSEQ VISIT: HCPCS | Performed by: NURSE PRACTITIONER

## 2025-07-14 PROCEDURE — 1123F ACP DISCUSS/DSCN MKR DOCD: CPT | Performed by: NURSE PRACTITIONER

## 2025-07-14 PROCEDURE — 3017F COLORECTAL CA SCREEN DOC REV: CPT | Performed by: NURSE PRACTITIONER

## 2025-07-14 PROCEDURE — G0296 VISIT TO DETERM LDCT ELIG: HCPCS | Performed by: NURSE PRACTITIONER

## 2025-07-14 PROCEDURE — 1159F MED LIST DOCD IN RCRD: CPT | Performed by: NURSE PRACTITIONER

## 2025-07-15 NOTE — PATIENT INSTRUCTIONS
9 Ways to Cut Back on Drinking  Maybe you've found yourself drinking more alcohol than you'd prefer. If you want to cut back, here are some ideas to try.    Think before you drink.  Do you really want a drink, or is it just a habit? If you're used to having a drink at a certain time, try doing something else then.     Look for substitutes.  Find some no-alcohol drinks that you enjoy, like flavored seltzer water, tea with honey, or tonic with a slice of lime. Or try alcohol-free beer or \"virgin\" cocktails (without the alcohol).     Drink more water.  Use water to quench your thirst. Drink a glass of water before you have any alcohol. Have another glass along with every drink or between drinks.     Shrink your drink.  For example, have a bottle of beer instead of a pint. Use a smaller glass for wine. Choose drinks with lower alcohol content (ABV%). Or use less liquor and more mixer in cocktails.     Slow down.  It's easy to drink quickly and without thinking about it. Pay attention, and make each drink last longer.     Do the math.  Total up how much you spend on alcohol each month. How much is that a year? If you cut back, what could you do with the money you save?     Take a break.  Choose a day or two each week when you won't drink at all. Notice how you feel on those days, physically and emotionally. How did you sleep? Do you feel better? Over time, add more break days.     Count calories.  Would you like to lose some weight? For some people that's a good motivator for cutting back. Figure out how many calories are in each drink. How many does that add up to in a day? In a week? In a month?     Practice saying no.  Be ready when someone offers you a drink. Try: \"Thanks, I've had enough.\" Or \"Thanks, but I'm cutting back.\" Or \"No, thanks. I feel better when I drink less.\"   Current as of: August 20, 2024  Content Version: 14.5  © 9766-4569 [employer] [facility].   Care instructions adapted under license by

## 2025-07-15 NOTE — PROGRESS NOTES
Medicare Annual Wellness Visit    Radu Suarez is here for Medicare AWV    Assessment & Plan   Medicare annual wellness visit, subsequent  Personal history of tobacco use  -     DE VISIT TO DISCUSS LUNG CA SCREEN W LDCT  -     CT Lung Screen (Initial/Annual/Baseline); Future       No follow-ups on file.     Subjective       Patient's complete Health Risk Assessment and screening values have been reviewed and are found in Flowsheets. The following problems were reviewed today and where indicated follow up appointments were made and/or referrals ordered.    Positive Risk Factor Screenings with Interventions:       Alcohol Screening:  AUDIT-C Score: 8  AUDIT Total Score: 10  Total Score Interpretation: 8-14 suggests harmful or hazardous alcohol consumption in men   Interventions:  See AVS for additional education material                 Dentist Screen:  Have you seen the dentist within the past year?: (!) No    Intervention:  Advised to schedule with their dentist    Hearing Screen:  Do you or your family notice any trouble with your hearing that hasn't been managed with hearing aids?: (!) Yes    Interventions:  Patient declines any further evaluation or treatment    Vision Screen:  Do you have difficulty driving, watching TV, or doing any of your daily activities because of your eyesight?: No  Have you had an eye exam within the past year?: (!) No  Interventions:   Patient encouraged to make appointment with their eye specialist         Lung Cancer Screening:  LDCT Screening: Discussed with patient the benefits and harms of screening, follow-up diagnostic testing, over-diagnosis, false positive rate, and total radiation exposure. Counseled on the importance of adherence to annual lung cancer LDCT screening, impact of comorbidities, ability and willingness to undergo diagnosis and treatment. Counseled on the importance of maintaining cigarette smoking abstinence and cessation. The patient has a history of >20 pack

## 2025-08-01 ENCOUNTER — HOSPITAL ENCOUNTER (OUTPATIENT)
Dept: LAB | Age: 75
Discharge: HOME | End: 2025-08-01
Payer: MEDICARE

## 2025-08-01 DIAGNOSIS — R97.20 ELEVATED PSA: ICD-10-CM

## 2025-08-01 DIAGNOSIS — C61 PROSTATE CANCER (HCC): ICD-10-CM

## 2025-08-01 LAB — PSA SERPL-MCNC: 0.73 NG/ML (ref 0–4)

## 2025-08-01 PROCEDURE — 36415 COLL VENOUS BLD VENIPUNCTURE: CPT

## 2025-08-01 PROCEDURE — 84153 ASSAY OF PSA TOTAL: CPT

## 2025-08-07 ENCOUNTER — HOSPITAL ENCOUNTER (OUTPATIENT)
Dept: RADIATION ONCOLOGY | Age: 75
Discharge: HOME | End: 2025-08-07
Payer: MEDICARE

## 2025-08-07 VITALS
BODY MASS INDEX: 28.62 KG/M2 | DIASTOLIC BLOOD PRESSURE: 80 MMHG | HEART RATE: 73 BPM | TEMPERATURE: 97.7 F | RESPIRATION RATE: 18 BRPM | WEIGHT: 188.2 LBS | SYSTOLIC BLOOD PRESSURE: 129 MMHG | OXYGEN SATURATION: 98 %

## 2025-08-07 DIAGNOSIS — C61 PROSTATE CANCER (HCC): ICD-10-CM

## 2025-08-07 DIAGNOSIS — R97.20 ELEVATED PSA: Primary | ICD-10-CM

## 2025-08-07 PROCEDURE — 99212 OFFICE O/P EST SF 10 MIN: CPT | Performed by: RADIOLOGY

## (undated) DEVICE — Device: Brand: ENDO SMARTCAP

## (undated) DEVICE — BRUSH ENDO CLN L90.5IN SHTH DIA1.7MM BRIST DIA5-7MM 2-6MM

## (undated) DEVICE — LABEL MED MINI W/ MARKER

## (undated) DEVICE — TOWEL,OR,DSP,ST,BLUE,STD,4/PK,20PK/CS: Brand: MEDLINE

## (undated) DEVICE — FORCEPS BX L240CM JAW DIA2.8MM L CAP W/ NDL MIC MESH TOOTH

## (undated) DEVICE — TUBE SET 96 MM 64 MM H2O PERISTALTIC STD AUX CHANNEL

## (undated) DEVICE — X-RAY DETECTABLE SPONGES,16 PLY: Brand: VISTEC

## (undated) DEVICE — PAD N ADH W3XL4IN POLY COT SFT PERF FLM EASILY CUT ABSRB

## (undated) DEVICE — MAX-CORE® DISPOSABLE CORE BIOPSY INSTRUMENT, 18G X 20CM: Brand: MAX-CORE

## (undated) DEVICE — TUBING, SUCTION, 1/4" X 10', STRAIGHT: Brand: MEDLINE

## (undated) DEVICE — LUBRICANT SURG JELLY ST BACTER TUBE 4.25OZ

## (undated) DEVICE — APPLICATOR PVP IOD SWABSTK MED NS LF

## (undated) DEVICE — GLOVE SURG SZ 8 L12IN FNGR THK83MIL CRM POLYISOPRENE

## (undated) DEVICE — COVER,TABLE,44X90,STERILE: Brand: MEDLINE

## (undated) DEVICE — ENDO CARRY-ON PROCEDURE KIT: Brand: ENDO CARRY-ON PROCEDURE KIT

## (undated) DEVICE — SINGLE PORT MANIFOLD: Brand: NEPTUNE 2